# Patient Record
Sex: MALE | Race: WHITE | HISPANIC OR LATINO | Employment: OTHER | URBAN - NONMETROPOLITAN AREA
[De-identification: names, ages, dates, MRNs, and addresses within clinical notes are randomized per-mention and may not be internally consistent; named-entity substitution may affect disease eponyms.]

---

## 2021-03-01 ENCOUNTER — OFFICE VISIT (OUTPATIENT)
Dept: CARDIOLOGY | Facility: CLINIC | Age: 66
End: 2021-03-01

## 2021-03-01 VITALS
HEIGHT: 67 IN | SYSTOLIC BLOOD PRESSURE: 132 MMHG | OXYGEN SATURATION: 97 % | DIASTOLIC BLOOD PRESSURE: 66 MMHG | BODY MASS INDEX: 47.24 KG/M2 | WEIGHT: 301 LBS | TEMPERATURE: 98.7 F | RESPIRATION RATE: 17 BRPM | HEART RATE: 66 BPM

## 2021-03-01 DIAGNOSIS — E11.9 TYPE 2 DIABETES MELLITUS WITHOUT COMPLICATION, WITHOUT LONG-TERM CURRENT USE OF INSULIN (HCC): ICD-10-CM

## 2021-03-01 DIAGNOSIS — I25.5 ISCHEMIC CARDIOMYOPATHY: Primary | ICD-10-CM

## 2021-03-01 DIAGNOSIS — Z72.0 TOBACCO USE: ICD-10-CM

## 2021-03-01 DIAGNOSIS — I10 ESSENTIAL HYPERTENSION: ICD-10-CM

## 2021-03-01 DIAGNOSIS — E78.5 HYPERLIPIDEMIA LDL GOAL <70: ICD-10-CM

## 2021-03-01 PROCEDURE — 99214 OFFICE O/P EST MOD 30 MIN: CPT | Performed by: INTERNAL MEDICINE

## 2021-03-01 PROCEDURE — 93000 ELECTROCARDIOGRAM COMPLETE: CPT | Performed by: INTERNAL MEDICINE

## 2021-03-01 RX ORDER — METOPROLOL TARTRATE 50 MG/1
50 TABLET, FILM COATED ORAL 2 TIMES DAILY
Qty: 180 TABLET | Refills: 3 | Status: SHIPPED | OUTPATIENT
Start: 2021-03-01 | End: 2022-02-03 | Stop reason: SDUPTHER

## 2021-03-01 RX ORDER — SACUBITRIL AND VALSARTAN 97; 103 MG/1; MG/1
1 TABLET, FILM COATED ORAL 2 TIMES DAILY
Qty: 180 TABLET | Refills: 3 | Status: SHIPPED | OUTPATIENT
Start: 2021-03-01 | End: 2021-07-01 | Stop reason: SDUPTHER

## 2021-03-01 RX ORDER — DAPAGLIFLOZIN 10 MG/1
10 TABLET, FILM COATED ORAL DAILY
Qty: 90 TABLET | Refills: 3 | Status: SHIPPED | OUTPATIENT
Start: 2021-03-01 | End: 2021-07-01 | Stop reason: SDUPTHER

## 2021-03-01 RX ORDER — ROSUVASTATIN CALCIUM 40 MG/1
40 TABLET, COATED ORAL DAILY
Qty: 90 TABLET | Refills: 3 | Status: SHIPPED | OUTPATIENT
Start: 2021-03-01 | End: 2022-02-03 | Stop reason: SDUPTHER

## 2021-03-01 NOTE — PROGRESS NOTES
[facility] [place]  [facility] CARDIOLOGY  [address] [place] [address]  Dept: [phone]  Dept Fax: [phone]    Sacha Rojas  1955    Follow Up Office Visit Note    History of Present Illness:  Sacha Rojas is a 65 y.o. male who presents to the clinic for Follow-up.ischemic cardiomyopathy-He seems doing well, no edema, no major SOB, on Entresto 49.51 bid, Metoprolol 50 mg bid and farxiga 10 mg daily, will d.c Maxzide,     The following portions of the patient's history were reviewed and updated as appropriate: allergies, current medications, past family history, past medical history, past social history, past surgical history and problem list.    Medications:  ASPIRIN 81 PO  Entresto tablet  Farxiga tablet  metFORMIN  metoprolol tartrate  rosuvastatin  triamterene-hydrochlorothiazide  vitamin B-12  vitamin D3 tablet    Subjective  Allergies   Allergen Reactions   • Clopidogrel Bisulfate Hives   • Varenicline Tartrate Hives        Past Medical History:   Diagnosis Date   • Benign essential hypertension    • Body mass index (BMI)40.0-44.9, adult (CMS/[facility])    • CAD (coronary artery disease) 2001    100% RCA 95% prox LAD   • COPD (chronic obstructive pulmonary disease) (CMS/[facility])    • Coronary arteriosclerosis in native artery    • Hypertension    • Impaired fasting blood sugar    • Ischemic cardiomyopathy     He seems doing fine, no chest pain, no SOB, no edema, he underwent cardiac cath with Moderate disase 60 to 70 % CX, LAD 60 to 70 % and %; will keep medical treatment Entresto 49.51 bid, Metoprolol 50 mg bid will add Farixga 10 mg daily.   • Mixed hyperlipidemia    • Obesity    • Tobacco dependence syndrome    • Tonsillitis    • Type 2 diabetes mellitus without complication, without long-term current use of insulin (CMS/[facility])    • Vitamin D deficiency        Past Surgical History:   Procedure Laterality Date   • ADENOIDECTOMY     • OTHER SURGICAL HISTORY       "STENT PLACEMENT   • TONSILLECTOMY         Family History   Problem Relation Age of Onset   • Stomach cancer Mother    • Coronary artery disease Father    • Peripheral vascular disease Father    • Hypertension Father         Social History     Socioeconomic History   • Marital status:      Spouse name: Not on file   • Number of children: Not on file   • Years of education: Not on file   • Highest education level: Not on file   Tobacco Use   • Smoking status: Heavy Tobacco Smoker     Types: Cigarettes   • Smokeless tobacco: Never Used   Substance and Sexual Activity   • Alcohol use: Not Currently   • Drug use: Never   • Sexual activity: Defer       Review of Systems   Constitutional: Negative.    HENT: Negative.    Respiratory: Negative.    Cardiovascular: Negative.    Endocrine: Negative.    Genitourinary: Negative.    Musculoskeletal: Negative.    Skin: Negative.    Allergic/Immunologic: Negative.    Neurological: Negative.    Hematological: Negative.    Psychiatric/Behavioral: Negative.        Cardiovascular Procedures    ECHO/MUGA:   STRESS TESTS:   CARDIAC CATH:   DEVICES:   HOLTER:   CT/MRI:   VASCULAR:   CARDIOTHORACIC:     Objective  Vitals:    03/01/21 1143   BP: 132/66   BP Location: Left arm   Patient Position: Sitting   Cuff Size: Large Adult   Pulse: 66   Resp: 17   Temp: 98.7 °F (37.1 °C)   TempSrc: Infrared   SpO2: 97%   Weight: (!) 137 kg (301 lb)   Height: 170.2 cm (67\")   PainSc: 0-No pain     Body mass index is 47.14 kg/m².     Physical Exam  Constitutional:       Appearance: Healthy appearance. Not in distress.   Neck:      Vascular: No JVR. JVD normal.   Pulmonary:      Effort: Pulmonary effort is normal.      Breath sounds: Normal breath sounds. No wheezing. No rhonchi. No rales.   Chest:      Chest wall: Not tender to palpatation.   Cardiovascular:      PMI at left midclavicular line. Normal rate. Regular rhythm. Normal S1. Normal S2.      Murmurs: There is no murmur.      No gallop. No " click. No rub.   Pulses:     Intact distal pulses.   Edema:     Peripheral edema absent.   Abdominal:      General: Bowel sounds are normal.      Palpations: Abdomen is soft.      Tenderness: There is no abdominal tenderness.   Musculoskeletal: Normal range of motion.         General: No tenderness.   Skin:     General: Skin is warm and dry.   Neurological:      General: No focal deficit present.      Mental Status: Alert and oriented to person, place and time.          Diagnostic Data    ECG 12 Lead    Date/Time: 3/1/2021 12:30 PM  Performed by: Darrion Lam MD  Authorized by: Darrion Lam MD   Comparison: compared with previous ECG   Similar to previous ECG  Rhythm: sinus rhythm  Rate: normal  BPM: 63  Conduction: non-specific intraventricular conduction delay  QRS axis: left    Clinical impression: normal ECG            Assessment and Plan  Diagnoses and all orders for this visit:    Ischemic cardiomyopathy- Seems doing well, will increase Entresto to 97.,103 bid, will keep Metoprolol and farxiga, will d.c Maxzide, we might add Aldactone next visit    Essential hypertension-BP is 140.80, on Entresto, Metoprolol,    Type 2 diabetes mellitus without complication, without long-term current use of insulin (CMS/[facility])    Tobacco use- Still smoking  Hyperlipidemia -- On Crestor lab from Dr Solano was elevated 180 LDL ? Claims to be taking his meds, will get a new lipid next visit         No follow-ups on file.    Darrion Lam MD  03/01/2021

## 2021-03-02 ENCOUNTER — TELEPHONE (OUTPATIENT)
Dept: CARDIOLOGY | Facility: CLINIC | Age: 66
End: 2021-03-02

## 2021-03-02 NOTE — TELEPHONE ENCOUNTER
PT'S wife, Marline called on behalf of PT.  He was in yesterday and was prescribed numerous medications but one specifically was taken off & one has doubled in dosage.  Marline is currently not on PT's  verbal release.  Please call PT's daughter Marcelal and clarify what medications need to be taken and the correct dosage. PT is currently working and can't  the phone.  PT's wife is concerned he may be taking wrong dosage.     PT's daughter Marcella can be reached at [phone]

## 2021-04-26 ENCOUNTER — TELEPHONE (OUTPATIENT)
Dept: CARDIOLOGY | Facility: CLINIC | Age: 66
End: 2021-04-26

## 2021-04-27 ENCOUNTER — TELEPHONE (OUTPATIENT)
Dept: CARDIOLOGY | Facility: CLINIC | Age: 66
End: 2021-04-27

## 2021-04-27 NOTE — TELEPHONE ENCOUNTER
PTS WIFE CALLED REGARDING MEDICATIONS. PT IS GETTING READY TO SIGN UP FOR MEDICARE, HE HAS 2 PRESCRIPTIONS HIS INSURANCE WILL NOT COVER. MEDICATIONS ARE ENTRESTO AND FARXIGA. PT CAN NOT AFFORD  WITHOUT INSURANCE COVERING THEM. WANTS ANOTHER MEDICATION THEY COULD SUBSTITUTE IN PLACE OF THOSE MEDICATIONS THAT INSURANCE WILL COVER. OR PLEASE ADVISE     BEST CALL BACK NUMBER: [phone]

## 2021-04-28 NOTE — TELEPHONE ENCOUNTER
Wife called back and gave his plan id# [id number] for [employer] eff 6/1/21. I told her a PA can't be started before he's effective, but she's insistent.

## 2021-06-15 ENCOUNTER — TELEPHONE (OUTPATIENT)
Dept: CARDIOLOGY | Facility: CLINIC | Age: 66
End: 2021-06-15

## 2021-06-16 ENCOUNTER — TELEPHONE (OUTPATIENT)
Dept: CARDIOLOGY | Facility: CLINIC | Age: 66
End: 2021-06-16

## 2021-06-16 DIAGNOSIS — E11.9 TYPE 2 DIABETES MELLITUS WITHOUT COMPLICATION, WITHOUT LONG-TERM CURRENT USE OF INSULIN (HCC): Primary | ICD-10-CM

## 2021-06-16 DIAGNOSIS — I25.5 ISCHEMIC CARDIOMYOPATHY: ICD-10-CM

## 2021-06-16 RX ORDER — DAPAGLIFLOZIN 10 MG/1
10 TABLET, FILM COATED ORAL DAILY
Qty: 90 TABLET | Refills: 3 | Status: CANCELLED | OUTPATIENT
Start: 2021-06-16

## 2021-06-16 NOTE — TELEPHONE ENCOUNTER
Pt needs to take insurance card to pharm so that he can see what price is and if it realy needs pa

## 2021-06-16 NOTE — TELEPHONE ENCOUNTER
PATIENT SAID HE IS RETIRED NOW, AND WAS TOLD HE WAS TO WAIT UNTIL NOW SO THE [facility] CAN GET A PA

## 2021-06-17 NOTE — TELEPHONE ENCOUNTER
entresto is 573 dollars for 90 day supply they are working on Dapagliflozin Propanediol (Farxiga) 10 MG tablet      Please advise about PA    Ted afford $573 dollars

## 2021-06-21 ENCOUNTER — TELEPHONE (OUTPATIENT)
Dept: CARDIOLOGY | Facility: CLINIC | Age: 66
End: 2021-06-21

## 2021-06-21 NOTE — TELEPHONE ENCOUNTER
PATIENT WOULD LIKE SAMPLES OF Dapagliflozin Propanediol (Farxiga) 10 MG tablet    UNTIL THE PA GOES THROUGH      PLEASE ADVISE

## 2021-07-01 DIAGNOSIS — I25.5 ISCHEMIC CARDIOMYOPATHY: ICD-10-CM

## 2021-07-01 DIAGNOSIS — E11.9 TYPE 2 DIABETES MELLITUS WITHOUT COMPLICATION, WITHOUT LONG-TERM CURRENT USE OF INSULIN (HCC): Primary | ICD-10-CM

## 2021-07-01 RX ORDER — SACUBITRIL AND VALSARTAN 97; 103 MG/1; MG/1
1 TABLET, FILM COATED ORAL 2 TIMES DAILY
Qty: 180 TABLET | Refills: 3 | Status: SHIPPED | OUTPATIENT
Start: 2021-07-01 | End: 2022-01-27

## 2021-07-01 RX ORDER — DAPAGLIFLOZIN 10 MG/1
10 TABLET, FILM COATED ORAL DAILY
Qty: 90 TABLET | Refills: 3 | Status: SHIPPED | OUTPATIENT
Start: 2021-07-01 | End: 2022-02-03 | Stop reason: SDUPTHER

## 2021-07-06 ENCOUNTER — TELEPHONE (OUTPATIENT)
Dept: CARDIOLOGY | Facility: CLINIC | Age: 66
End: 2021-07-06

## 2021-08-30 ENCOUNTER — LAB (OUTPATIENT)
Dept: CARDIOLOGY | Facility: CLINIC | Age: 66
End: 2021-08-30

## 2021-08-30 DIAGNOSIS — E78.5 HYPERLIPIDEMIA LDL GOAL <70: ICD-10-CM

## 2021-08-30 DIAGNOSIS — I25.5 ISCHEMIC CARDIOMYOPATHY: Primary | ICD-10-CM

## 2021-08-30 DIAGNOSIS — Z72.0 TOBACCO USE: ICD-10-CM

## 2021-08-30 DIAGNOSIS — I10 ESSENTIAL HYPERTENSION: ICD-10-CM

## 2021-08-30 DIAGNOSIS — E11.9 TYPE 2 DIABETES MELLITUS WITHOUT COMPLICATION, WITHOUT LONG-TERM CURRENT USE OF INSULIN (HCC): ICD-10-CM

## 2021-08-31 ENCOUNTER — TELEPHONE (OUTPATIENT)
Dept: CARDIOLOGY | Facility: CLINIC | Age: 66
End: 2021-08-31

## 2021-08-31 LAB
ALBUMIN SERPL-MCNC: 4.4 G/DL (ref 3.8–4.8)
ALBUMIN/GLOB SERPL: 2 {RATIO} (ref 1.2–2.2)
ALP SERPL-CCNC: 116 IU/L (ref 48–121)
ALT SERPL-CCNC: 20 IU/L (ref 0–44)
AST SERPL-CCNC: 19 IU/L (ref 0–40)
BASOPHILS # BLD AUTO: 0 X10E3/UL (ref 0–0.2)
BASOPHILS NFR BLD AUTO: 1 %
BILIRUB SERPL-MCNC: 0.4 MG/DL (ref 0–1.2)
BUN SERPL-MCNC: 12 MG/DL (ref 8–27)
BUN/CREAT SERPL: 14 (ref 10–24)
CALCIUM SERPL-MCNC: 9.2 MG/DL (ref 8.6–10.2)
CHLORIDE SERPL-SCNC: 105 MMOL/L (ref 96–106)
CHOLEST SERPL-MCNC: 127 MG/DL (ref 100–199)
CK SERPL-CCNC: 53 U/L (ref 41–331)
CO2 SERPL-SCNC: 20 MMOL/L (ref 20–29)
CREAT SERPL-MCNC: 0.85 MG/DL (ref 0.76–1.27)
EOSINOPHIL # BLD AUTO: 0.2 X10E3/UL (ref 0–0.4)
EOSINOPHIL NFR BLD AUTO: 3 %
ERYTHROCYTE [DISTWIDTH] IN BLOOD BY AUTOMATED COUNT: 16.6 % (ref 11.6–15.4)
GLOBULIN SER CALC-MCNC: 2.2 G/DL (ref 1.5–4.5)
GLUCOSE SERPL-MCNC: 100 MG/DL (ref 65–99)
HCT VFR BLD AUTO: 50.1 % (ref 37.5–51)
HDLC SERPL-MCNC: 34 MG/DL
HGB BLD-MCNC: 16.2 G/DL (ref 13–17.7)
IMM GRANULOCYTES # BLD AUTO: 0 X10E3/UL (ref 0–0.1)
IMM GRANULOCYTES NFR BLD AUTO: 0 %
LDLC SERPL CALC-MCNC: 72 MG/DL (ref 0–99)
LYMPHOCYTES # BLD AUTO: 2.2 X10E3/UL (ref 0.7–3.1)
LYMPHOCYTES NFR BLD AUTO: 26 %
MCH RBC QN AUTO: 28.7 PG (ref 26.6–33)
MCHC RBC AUTO-ENTMCNC: 32.3 G/DL (ref 31.5–35.7)
MCV RBC AUTO: 89 FL (ref 79–97)
MONOCYTES # BLD AUTO: 0.6 X10E3/UL (ref 0.1–0.9)
MONOCYTES NFR BLD AUTO: 7 %
NEUTROPHILS # BLD AUTO: 5.5 X10E3/UL (ref 1.4–7)
NEUTROPHILS NFR BLD AUTO: 63 %
PLATELET # BLD AUTO: 181 X10E3/UL (ref 150–450)
POTASSIUM SERPL-SCNC: 4.1 MMOL/L (ref 3.5–5.2)
PROT SERPL-MCNC: 6.6 G/DL (ref 6–8.5)
RBC # BLD AUTO: 5.65 X10E6/UL (ref 4.14–5.8)
SODIUM SERPL-SCNC: 141 MMOL/L (ref 134–144)
TRIGL SERPL-MCNC: 112 MG/DL (ref 0–149)
VLDLC SERPL CALC-MCNC: 21 MG/DL (ref 5–40)
WBC # BLD AUTO: 8.5 X10E3/UL (ref 3.4–10.8)

## 2021-09-10 ENCOUNTER — OFFICE VISIT (OUTPATIENT)
Dept: CARDIOLOGY | Facility: CLINIC | Age: 66
End: 2021-09-10

## 2021-09-10 VITALS
WEIGHT: 306 LBS | HEART RATE: 82 BPM | BODY MASS INDEX: 48.03 KG/M2 | DIASTOLIC BLOOD PRESSURE: 88 MMHG | TEMPERATURE: 97 F | RESPIRATION RATE: 12 BRPM | HEIGHT: 67 IN | SYSTOLIC BLOOD PRESSURE: 162 MMHG | OXYGEN SATURATION: 99 %

## 2021-09-10 DIAGNOSIS — E11.9 TYPE 2 DIABETES MELLITUS WITHOUT COMPLICATION, WITHOUT LONG-TERM CURRENT USE OF INSULIN (HCC): ICD-10-CM

## 2021-09-10 DIAGNOSIS — I25.5 ISCHEMIC CARDIOMYOPATHY: Primary | ICD-10-CM

## 2021-09-10 DIAGNOSIS — E78.5 HYPERLIPIDEMIA LDL GOAL <70: ICD-10-CM

## 2021-09-10 DIAGNOSIS — Z72.0 TOBACCO USE: ICD-10-CM

## 2021-09-10 DIAGNOSIS — I10 ESSENTIAL HYPERTENSION: ICD-10-CM

## 2021-09-10 PROCEDURE — 99214 OFFICE O/P EST MOD 30 MIN: CPT | Performed by: INTERNAL MEDICINE

## 2021-09-10 RX ORDER — SPIRONOLACTONE 25 MG/1
25 TABLET ORAL DAILY
Qty: 90 TABLET | Refills: 3 | Status: SHIPPED | OUTPATIENT
Start: 2021-09-10 | End: 2022-02-03 | Stop reason: SDUPTHER

## 2021-09-10 NOTE — PROGRESS NOTES
[facility] [place]  [facility] CARDIOLOGY  [address] [place] [address]  Dept: [phone]  Dept Fax: [phone]    Sacha Rojas  1955    Follow Up Office Visit Note    History of Present Illness:  Sacha Rojas is a 66 y.o. male who presents to the clinic for Follow-up. CAD- He denies any chest pain, or SOB, - He seems doing well, his BP is 145.80, will add Aldactone 25 mg, he is on Entresto 97.,103 bid, Metoprolol 50 mg bid and Farxiga 10 mg daily  With ASA, will recheck the Ef next visit with echo last year was 35 to 40%    The following portions of the patient's history were reviewed and updated as appropriate: allergies, current medications, past family history, past medical history, past social history, past surgical history and problem list.    Medications:  ASPIRIN 81 PO  Entresto tablet  Farxiga tablet  metFORMIN  metoprolol tartrate  rosuvastatin  vitamin B-12  vitamin D3 tablet    Subjective  Allergies   Allergen Reactions   • Clopidogrel Bisulfate Hives   • Varenicline Tartrate Hives        Past Medical History:   Diagnosis Date   • Benign essential hypertension    • Body mass index (BMI)40.0-44.9, adult (CMS/[facility])    • CAD (coronary artery disease) 2001    100% RCA 95% prox LAD   • COPD (chronic obstructive pulmonary disease) (CMS/[facility])    • Coronary arteriosclerosis in native artery    • Hypertension    • Impaired fasting blood sugar    • Ischemic cardiomyopathy     He seems doing fine, no chest pain, no SOB, no edema, he underwent cardiac cath with Moderate disase 60 to 70 % CX, LAD 60 to 70 % and %; will keep medical treatment Entresto 49.51 bid, Metoprolol 50 mg bid will add Farixga 10 mg daily.   • Mixed hyperlipidemia    • Obesity    • Tobacco dependence syndrome    • Tonsillitis    • Type 2 diabetes mellitus without complication, without long-term current use of insulin (CMS/[facility])    • Vitamin D deficiency        Past Surgical History:  "  Procedure Laterality Date   • ADENOIDECTOMY     • OTHER SURGICAL HISTORY      STENT PLACEMENT   • TONSILLECTOMY         Family History   Problem Relation Age of Onset   • Stomach cancer Mother    • Coronary artery disease Father    • Peripheral vascular disease Father    • Hypertension Father         Social History     Socioeconomic History   • Marital status:      Spouse name: Not on file   • Number of children: Not on file   • Years of education: Not on file   • Highest education level: Not on file   Tobacco Use   • Smoking status: Heavy Tobacco Smoker     Types: Cigarettes   • Smokeless tobacco: Never Used   Vaping Use   • Vaping Use: Never used   Substance and Sexual Activity   • Alcohol use: Not Currently   • Drug use: Never   • Sexual activity: Defer       Review of Systems   Constitutional: Negative.    HENT: Negative.    Respiratory: Negative.    Cardiovascular: Negative.    Endocrine: Negative.    Genitourinary: Negative.    Musculoskeletal: Negative.    Skin: Negative.    Allergic/Immunologic: Negative.    Neurological: Negative.    Hematological: Negative.    Psychiatric/Behavioral: Negative.    All other systems reviewed and are negative.      Cardiovascular Procedures    ECHO/MUGA:   STRESS TESTS:   CARDIAC CATH:   DEVICES:   HOLTER:   CT/MRI:   VASCULAR:   CARDIOTHORACIC:     Objective  Vitals:    09/10/21 1437   BP: 162/88   BP Location: Left arm   Patient Position: Sitting   Cuff Size: Adult   Pulse: 82   Resp: 12   Temp: 97 °F (36.1 °C)   TempSrc: Infrared   SpO2: 99%   Weight: (!) 139 kg (306 lb)   Height: 170.2 cm (67\")   PainSc: 0-No pain     Body mass index is 47.93 kg/m².     Physical Exam  Vitals reviewed.   Constitutional:       Appearance: Healthy appearance. Not in distress.   Eyes:      Pupils: Pupils are equal, round, and reactive to light.   HENT:    Mouth/Throat:      Pharynx: Oropharynx is clear.   Neck:      Thyroid: Thyroid normal.      Vascular: No JVR. JVD normal. "   Pulmonary:      Effort: Pulmonary effort is normal.      Breath sounds: Normal breath sounds. No wheezing. No rhonchi. No rales.   Chest:      Chest wall: Not tender to palpatation.   Cardiovascular:      PMI at left midclavicular line. Normal rate. Regular rhythm. Normal S1. Normal S2.      Murmurs: There is no murmur.      No gallop. No click. No rub.   Pulses:     Carotid: 3+ bilaterally.     Radial: 3+ bilaterally.     Femoral: 3+ bilaterally.     Dorsalis pedis: 3+ bilaterally.     Posterior tibial: 3+ bilaterally.  Edema:     Peripheral edema absent.   Abdominal:      General: Bowel sounds are normal.      Palpations: Abdomen is soft.      Tenderness: There is no abdominal tenderness.   Musculoskeletal: Normal range of motion.         General: No tenderness.      Cervical back: Normal range of motion and neck supple. Skin:     General: Skin is warm and dry.   Neurological:      General: No focal deficit present.      Mental Status: Alert and oriented to person, place and time.          Diagnostic Data  Procedures    Assessment and Plan  Diagnoses and all orders for this visit:    Ischemic cardiomyopathy- He seems doing well, mild edema, will add Aldactone 25 mg, keep Entresto, Metoprolol and Farxiga, will get an echo in 6 months    Essential hypertension- The BP is 145.80. will add Aldactone     Type 2 diabetes mellitus without complication, without long-term current use of insulin (CMS/[facility])    Tobacco use- Still smoking     Hyperlipidemia LDL goal <70- Crestor 40 mg         No follow-ups on file.    Darrion Lam MD  09/10/2021

## 2022-01-26 ENCOUNTER — TELEPHONE (OUTPATIENT)
Dept: CARDIOLOGY | Facility: CLINIC | Age: 67
End: 2022-01-26

## 2022-01-26 ENCOUNTER — OFFICE VISIT (OUTPATIENT)
Dept: CARDIOLOGY | Facility: CLINIC | Age: 67
End: 2022-01-26

## 2022-01-26 VITALS
WEIGHT: 314 LBS | HEART RATE: 72 BPM | BODY MASS INDEX: 49.28 KG/M2 | DIASTOLIC BLOOD PRESSURE: 78 MMHG | RESPIRATION RATE: 18 BRPM | SYSTOLIC BLOOD PRESSURE: 126 MMHG | HEIGHT: 67 IN | TEMPERATURE: 97.5 F | OXYGEN SATURATION: 97 %

## 2022-01-26 DIAGNOSIS — E11.9 TYPE 2 DIABETES MELLITUS WITHOUT COMPLICATION, WITHOUT LONG-TERM CURRENT USE OF INSULIN: ICD-10-CM

## 2022-01-26 DIAGNOSIS — Z72.0 TOBACCO USE: ICD-10-CM

## 2022-01-26 DIAGNOSIS — E78.5 HYPERLIPIDEMIA LDL GOAL <70: ICD-10-CM

## 2022-01-26 DIAGNOSIS — I25.5 ISCHEMIC CARDIOMYOPATHY: ICD-10-CM

## 2022-01-26 DIAGNOSIS — I10 ESSENTIAL HYPERTENSION: Primary | ICD-10-CM

## 2022-01-26 PROCEDURE — 99214 OFFICE O/P EST MOD 30 MIN: CPT | Performed by: INTERNAL MEDICINE

## 2022-01-26 PROCEDURE — 93000 ELECTROCARDIOGRAM COMPLETE: CPT | Performed by: INTERNAL MEDICINE

## 2022-01-26 NOTE — TELEPHONE ENCOUNTER
sacubitril-valsartan (Entresto)  MG tablet     form was never filled please fill it out      And send it

## 2022-01-26 NOTE — PROGRESS NOTES
[facility] [place]  [facility] CARDIOLOGY  [address] [place] [address]  Dept: [phone]  Dept Fax: [phone]    Sacha Rojas  1955     Follow Up Office Visit Note    History of Present Illness:  Sacha Rojas is a 66 y.o. male who presents to the clinic for  Ischemic cardiomyopathy- He denies any complaints SOB, chest pain, edema, he is on Entresto 97,.103 bid,Metoprolol 50 mg bid,Aldactone 25 mg and  Farxiga 10 mg, EKG no changes old sheryl septal MI, HR 62, he has prior stents to LAD and OM  2004, last cardiac cath  10.2020 moderate disease Cx 70%. LAD 40% proximal and 60% distal RCA is chonically occluded, , on Asa, will keep same meds , will get an echo to reevaluate EF,. Last one was 2020 32 by nuclear and 35 to 405 by echo,     The following portions of the patient's history were reviewed and updated as appropriate: allergies, current medications, past family history, past medical history, past social history, past surgical history and problem list.    Medications:  ASPIRIN 81 PO  Entresto tablet  Farxiga tablet  metFORMIN  metoprolol tartrate  rosuvastatin  spironolactone  vitamin B-12  vitamin D3 tablet    Subjective  Allergies   Allergen Reactions   • Clopidogrel Bisulfate Hives   • Varenicline Tartrate Hives        Past Medical History:   Diagnosis Date   • Benign essential hypertension    • Body mass index (BMI)40.0-44.9, adult    • CAD (coronary artery disease) 2001    100% RCA 95% prox LAD   • COPD (chronic obstructive pulmonary disease) ([facility])    • Coronary arteriosclerosis in native artery    • Hypertension    • Impaired fasting blood sugar    • Ischemic cardiomyopathy     He seems doing fine, no chest pain, no SOB, no edema, he underwent cardiac cath with Moderate disase 60 to 70 % CX, LAD 60 to 70 % and %; will keep medical treatment Entresto 49.51 bid, Metoprolol 50 mg bid will add Farixga 10 mg daily.   • Mixed hyperlipidemia    •  "Obesity    • Tobacco dependence syndrome    • Tonsillitis    • Type 2 diabetes mellitus without complication, without long-term current use of insulin (HCC)    • Vitamin D deficiency        Past Surgical History:   Procedure Laterality Date   • ADENOIDECTOMY     • OTHER SURGICAL HISTORY      STENT PLACEMENT   • TONSILLECTOMY         Family History   Problem Relation Age of Onset   • Stomach cancer Mother    • Coronary artery disease Father    • Peripheral vascular disease Father    • Hypertension Father         Social History     Socioeconomic History   • Marital status:    Tobacco Use   • Smoking status: Heavy Tobacco Smoker     Types: Cigarettes   • Smokeless tobacco: Never Used   Vaping Use   • Vaping Use: Never used   Substance and Sexual Activity   • Alcohol use: Not Currently   • Drug use: Never   • Sexual activity: Defer       Review of Systems   Constitutional: Negative.    HENT: Negative.    Respiratory: Negative.    Cardiovascular: Negative.    Endocrine: Negative.    Genitourinary: Negative.    Musculoskeletal: Negative.    Skin: Negative.    Allergic/Immunologic: Negative.    Neurological: Negative.    Hematological: Negative.    Psychiatric/Behavioral: Negative.    All other systems reviewed and are negative.      Cardiovascular Procedures    ECHO/MUGA:   STRESS TESTS:   CARDIAC CATH:   DEVICES:   HOLTER:   CT/MRI:   VASCULAR:   CARDIOTHORACIC:     Objective  Vitals:    01/26/22 0923   BP: 126/78   BP Location: Right arm   Patient Position: Lying   Cuff Size: Large Adult   Pulse: 72   Resp: 18   Temp: 97.5 °F (36.4 °C)   TempSrc: Infrared   SpO2: 97%   Weight: (!) 142 kg (314 lb)   Height: 170.2 cm (67.01\")   PainSc: 0-No pain     Body mass index is 49.17 kg/m².     Physical Exam  Constitutional:       Appearance: Healthy appearance. Not in distress.   Neck:      Vascular: No JVR. JVD normal.   Pulmonary:      Effort: Pulmonary effort is normal.      Breath sounds: Normal breath sounds. No " wheezing. No rhonchi. No rales.   Chest:      Chest wall: Not tender to palpatation.   Cardiovascular:      PMI at left midclavicular line. Normal rate. Regular rhythm. Normal S1. Normal S2.      Murmurs: There is no murmur.      No gallop. No click. No rub.   Pulses:     Intact distal pulses.   Edema:     Peripheral edema absent.   Abdominal:      General: Bowel sounds are normal.      Palpations: Abdomen is soft.      Tenderness: There is no abdominal tenderness.   Musculoskeletal: Normal range of motion.         General: No tenderness. Skin:     General: Skin is warm and dry.   Neurological:      General: No focal deficit present.      Mental Status: Alert and oriented to person, place and time.          Diagnostic Data    ECG 12 Lead    Date/Time: 1/26/2022 10:02 AM  Performed by: Darrion Lam MD  Authorized by: Darrion Lam MD   Comparison: compared with previous ECG from 1/3/2021  Rhythm: sinus rhythm  Rate: normal  BPM: 62  Q waves: V1, V2, V3 and V4    QRS axis: left  Other findings: non-specific ST-T wave changes and poor R wave progression    Clinical impression: abnormal EKG and myocardial infarction            Assessment and Plan  Diagnoses and all orders for this visit:    Essential hypertension- The BP is fine 125.80. on Aldactone, Entresto, Metoprolol   -     CBC & Differential  -     Lipid Panel    Hyperlipidemia LDL goal <70- On Crestor 40 mg, we need lipid today   -     Lipid Panel    Ischemic cardiomyopathy- Denies any major complaints, prior stent to LAD and PL Om, in 2004,. , last cath  10.2020 moderate disease on Medical treatment   -     Adult Transthoracic Echo Complete W/ Cont if Necessary Per Protocol; Future  -     CBC & Differential  -     Comprehensive Metabolic Panel  -     Lipid Panel    Type 2 diabetes mellitus without complication, without long-term current use of insulin (HCC)    Tobacco use- Still smoking, high risk recurrent events          No follow-ups on  file.    Darrion Lam MD  01/26/2022

## 2022-01-26 NOTE — TELEPHONE ENCOUNTER
Patient has been scheduled for Echo to be done at [facility], due to weight limit, Wed 2/2/22 12:45 arrival. Patient was given appointment information at checkout.

## 2022-01-27 DIAGNOSIS — I25.5 ISCHEMIC CARDIOMYOPATHY: ICD-10-CM

## 2022-01-27 LAB
BASOPHILS # BLD AUTO: 0 X10E3/UL (ref 0–0.2)
BASOPHILS NFR BLD AUTO: 0 %
EOSINOPHIL # BLD AUTO: 0.3 X10E3/UL (ref 0–0.4)
EOSINOPHIL NFR BLD AUTO: 3 %
ERYTHROCYTE [DISTWIDTH] IN BLOOD BY AUTOMATED COUNT: 15.8 % (ref 11.6–15.4)
HCT VFR BLD AUTO: 50.2 % (ref 37.5–51)
HGB BLD-MCNC: 17 G/DL (ref 13–17.7)
IMM GRANULOCYTES # BLD AUTO: 0.1 X10E3/UL (ref 0–0.1)
IMM GRANULOCYTES NFR BLD AUTO: 1 %
LYMPHOCYTES # BLD AUTO: 2.1 X10E3/UL (ref 0.7–3.1)
LYMPHOCYTES NFR BLD AUTO: 22 %
MCH RBC QN AUTO: 29 PG (ref 26.6–33)
MCHC RBC AUTO-ENTMCNC: 33.9 G/DL (ref 31.5–35.7)
MCV RBC AUTO: 86 FL (ref 79–97)
MONOCYTES # BLD AUTO: 0.7 X10E3/UL (ref 0.1–0.9)
MONOCYTES NFR BLD AUTO: 7 %
NEUTROPHILS # BLD AUTO: 6.5 X10E3/UL (ref 1.4–7)
NEUTROPHILS NFR BLD AUTO: 67 %
PLATELET # BLD AUTO: 198 X10E3/UL (ref 150–450)
RBC # BLD AUTO: 5.86 X10E6/UL (ref 4.14–5.8)
SPECIMEN STATUS: NORMAL
WBC # BLD AUTO: 9.7 X10E3/UL (ref 3.4–10.8)

## 2022-01-27 RX ORDER — SACUBITRIL AND VALSARTAN 97; 103 MG/1; MG/1
1 TABLET, FILM COATED ORAL 2 TIMES DAILY
Qty: 180 TABLET | Refills: 3 | Status: SHIPPED | OUTPATIENT
Start: 2022-01-27 | End: 2022-01-28

## 2022-01-28 ENCOUNTER — LAB (OUTPATIENT)
Dept: CARDIOLOGY | Facility: CLINIC | Age: 67
End: 2022-01-28

## 2022-01-28 ENCOUNTER — TELEPHONE (OUTPATIENT)
Dept: CARDIOLOGY | Facility: CLINIC | Age: 67
End: 2022-01-28

## 2022-01-28 DIAGNOSIS — E78.5 HYPERLIPIDEMIA LDL GOAL <70: Primary | ICD-10-CM

## 2022-01-28 DIAGNOSIS — I25.5 ISCHEMIC CARDIOMYOPATHY: ICD-10-CM

## 2022-01-28 RX ORDER — SACUBITRIL AND VALSARTAN 97; 103 MG/1; MG/1
1 TABLET, FILM COATED ORAL 2 TIMES DAILY
Qty: 180 TABLET | Refills: 3 | Status: SHIPPED | OUTPATIENT
Start: 2022-01-28 | End: 2022-02-03 | Stop reason: SDUPTHER

## 2022-01-29 LAB
ALBUMIN SERPL-MCNC: 4.5 G/DL (ref 3.8–4.8)
ALBUMIN/GLOB SERPL: 1.8 {RATIO} (ref 1.2–2.2)
ALP SERPL-CCNC: 116 IU/L (ref 44–121)
ALT SERPL-CCNC: 30 IU/L (ref 0–44)
AST SERPL-CCNC: 23 IU/L (ref 0–40)
BILIRUB SERPL-MCNC: 0.4 MG/DL (ref 0–1.2)
BUN SERPL-MCNC: 16 MG/DL (ref 8–27)
BUN/CREAT SERPL: 15 (ref 10–24)
CALCIUM SERPL-MCNC: 9.7 MG/DL (ref 8.6–10.2)
CHLORIDE SERPL-SCNC: 104 MMOL/L (ref 96–106)
CHOLEST SERPL-MCNC: 144 MG/DL (ref 100–199)
CO2 SERPL-SCNC: 22 MMOL/L (ref 20–29)
CREAT SERPL-MCNC: 1.08 MG/DL (ref 0.76–1.27)
GLOBULIN SER CALC-MCNC: 2.5 G/DL (ref 1.5–4.5)
GLUCOSE SERPL-MCNC: 124 MG/DL (ref 65–99)
HDLC SERPL-MCNC: 34 MG/DL
LDLC SERPL CALC-MCNC: 86 MG/DL (ref 0–99)
POTASSIUM SERPL-SCNC: 4.6 MMOL/L (ref 3.5–5.2)
PROT SERPL-MCNC: 7 G/DL (ref 6–8.5)
SODIUM SERPL-SCNC: 142 MMOL/L (ref 134–144)
TRIGL SERPL-MCNC: 137 MG/DL (ref 0–149)
VLDLC SERPL CALC-MCNC: 24 MG/DL (ref 5–40)

## 2022-02-02 ENCOUNTER — OUTSIDE FACILITY SERVICE (OUTPATIENT)
Dept: CARDIOLOGY | Facility: CLINIC | Age: 67
End: 2022-02-02

## 2022-02-02 PROCEDURE — 93306 TTE W/DOPPLER COMPLETE: CPT | Performed by: INTERNAL MEDICINE

## 2022-02-03 ENCOUNTER — OFFICE VISIT (OUTPATIENT)
Dept: CARDIOLOGY | Facility: CLINIC | Age: 67
End: 2022-02-03

## 2022-02-03 VITALS
HEART RATE: 69 BPM | WEIGHT: 306 LBS | HEIGHT: 67 IN | TEMPERATURE: 97.1 F | DIASTOLIC BLOOD PRESSURE: 76 MMHG | BODY MASS INDEX: 48.03 KG/M2 | SYSTOLIC BLOOD PRESSURE: 144 MMHG | OXYGEN SATURATION: 96 % | RESPIRATION RATE: 15 BRPM

## 2022-02-03 DIAGNOSIS — E11.9 TYPE 2 DIABETES MELLITUS WITHOUT COMPLICATION, WITHOUT LONG-TERM CURRENT USE OF INSULIN: ICD-10-CM

## 2022-02-03 DIAGNOSIS — I25.5 ISCHEMIC CARDIOMYOPATHY: Primary | ICD-10-CM

## 2022-02-03 DIAGNOSIS — I10 ESSENTIAL HYPERTENSION: ICD-10-CM

## 2022-02-03 DIAGNOSIS — Z72.0 TOBACCO USE: ICD-10-CM

## 2022-02-03 DIAGNOSIS — E78.5 HYPERLIPIDEMIA LDL GOAL <70: ICD-10-CM

## 2022-02-03 PROCEDURE — 99214 OFFICE O/P EST MOD 30 MIN: CPT | Performed by: INTERNAL MEDICINE

## 2022-02-03 RX ORDER — ROSUVASTATIN CALCIUM 40 MG/1
40 TABLET, COATED ORAL DAILY
Qty: 90 TABLET | Refills: 3 | Status: SHIPPED | OUTPATIENT
Start: 2022-02-03 | End: 2022-05-09 | Stop reason: SDUPTHER

## 2022-02-03 RX ORDER — METOPROLOL TARTRATE 50 MG/1
50 TABLET, FILM COATED ORAL 2 TIMES DAILY
Qty: 180 TABLET | Refills: 3 | Status: SHIPPED | OUTPATIENT
Start: 2022-02-03 | End: 2022-05-09 | Stop reason: SDUPTHER

## 2022-02-03 RX ORDER — SPIRONOLACTONE 25 MG/1
25 TABLET ORAL DAILY
Qty: 90 TABLET | Refills: 3 | Status: SHIPPED | OUTPATIENT
Start: 2022-02-03 | End: 2022-10-03 | Stop reason: SDUPTHER

## 2022-02-03 RX ORDER — EZETIMIBE 10 MG/1
10 TABLET ORAL DAILY
Qty: 90 TABLET | Refills: 3 | Status: SHIPPED | OUTPATIENT
Start: 2022-02-03 | End: 2022-06-28 | Stop reason: SDUPTHER

## 2022-02-03 RX ORDER — DAPAGLIFLOZIN 10 MG/1
10 TABLET, FILM COATED ORAL DAILY
Qty: 90 TABLET | Refills: 3 | Status: SHIPPED | OUTPATIENT
Start: 2022-02-03 | End: 2022-03-31

## 2022-02-03 RX ORDER — SACUBITRIL AND VALSARTAN 97; 103 MG/1; MG/1
1 TABLET, FILM COATED ORAL 2 TIMES DAILY
Qty: 180 TABLET | Refills: 3 | Status: SHIPPED | OUTPATIENT
Start: 2022-02-03 | End: 2022-10-03 | Stop reason: SDUPTHER

## 2022-02-03 NOTE — PROGRESS NOTES
[facility] [place]  [facility] CARDIOLOGY  [address] [place] [address]  Dept: [phone]  Dept Fax: [phone]    Sacha Rojas  1955    Follow Up Office Visit Note    History of Present Illness:  Sacha Rojas is a 66 y.o. male who presents to the clinic for Follow-up. Ischemic cardiomyopathy- He denies any chest pain, SOB, edema, on Metoprolol 50 mg bid, Entresto 97/103 bid .Aldactone 25 mg and Farxiga, his echo yesterday Ef 35 to 40% , will keep same meds    The following portions of the patient's history were reviewed and updated as appropriate: allergies, current medications, past family history, past medical history, past social history, past surgical history and problem list.    Medications:  ASPIRIN 81 PO  Entresto tablet  ezetimibe  Farxiga tablet  metFORMIN  metoprolol tartrate  rosuvastatin  spironolactone  vitamin B-12  vitamin D3 tablet    Subjective  Allergies   Allergen Reactions   • Clopidogrel Bisulfate Hives   • Varenicline Tartrate Hives        Past Medical History:   Diagnosis Date   • Benign essential hypertension    • Body mass index (BMI)40.0-44.9, adult    • CAD (coronary artery disease) 2001    100% RCA 95% prox LAD   • COPD (chronic obstructive pulmonary disease) ([facility])    • Coronary arteriosclerosis in native artery    • Hypertension    • Impaired fasting blood sugar    • Ischemic cardiomyopathy     He seems doing fine, no chest pain, no SOB, no edema, he underwent cardiac cath with Moderate disase 60 to 70 % CX, LAD 60 to 70 % and %; will keep medical treatment Entresto 49.51 bid, Metoprolol 50 mg bid will add Farixga 10 mg daily.   • Mixed hyperlipidemia    • Obesity    • Tobacco dependence syndrome    • Tonsillitis    • Type 2 diabetes mellitus without complication, without long-term current use of insulin ([facility])    • Vitamin D deficiency        Past Surgical History:   Procedure Laterality Date   • ADENOIDECTOMY     • OTHER  "SURGICAL HISTORY      STENT PLACEMENT   • TONSILLECTOMY         Family History   Problem Relation Age of Onset   • Stomach cancer Mother    • Coronary artery disease Father    • Peripheral vascular disease Father    • Hypertension Father         Social History     Socioeconomic History   • Marital status:    Tobacco Use   • Smoking status: Heavy Tobacco Smoker     Types: Cigarettes   • Smokeless tobacco: Never Used   Vaping Use   • Vaping Use: Never used   Substance and Sexual Activity   • Alcohol use: Not Currently   • Drug use: Never   • Sexual activity: Defer       Review of Systems   Constitutional: Negative.    HENT: Negative.    Respiratory: Negative.    Cardiovascular: Negative.    Endocrine: Negative.    Genitourinary: Negative.    Musculoskeletal: Negative.    Skin: Negative.    Allergic/Immunologic: Negative.    Neurological: Negative.    Hematological: Negative.    Psychiatric/Behavioral: Negative.        Cardiovascular Procedures    ECHO/MUGA:   STRESS TESTS:   CARDIAC CATH:   DEVICES:   HOLTER:   CT/MRI:   VASCULAR:   CARDIOTHORACIC:     Objective  Vitals:    02/03/22 0926   BP: 144/76   BP Location: Right arm   Patient Position: Sitting   Cuff Size: Large Adult   Pulse: 69   Resp: 15   Temp: 97.1 °F (36.2 °C)   TempSrc: Infrared   SpO2: 96%   Weight: (!) 139 kg (306 lb)   Height: 170.2 cm (67\")   PainSc: 0-No pain     Body mass index is 47.93 kg/m².     Physical Exam  Constitutional:       Appearance: Healthy appearance. Not in distress.   Neck:      Vascular: No JVR. JVD normal.   Pulmonary:      Effort: Pulmonary effort is normal.      Breath sounds: Normal breath sounds. No wheezing. No rhonchi. No rales.   Chest:      Chest wall: Not tender to palpatation.   Cardiovascular:      PMI at left midclavicular line. Normal rate. Regular rhythm. Normal S1. Normal S2.      Murmurs: There is no murmur.      No gallop. No click. No rub.   Pulses:     Intact distal pulses.   Edema:     Peripheral edema " absent.   Abdominal:      General: Bowel sounds are normal.      Palpations: Abdomen is soft.      Tenderness: There is no abdominal tenderness.   Musculoskeletal: Normal range of motion.         General: No tenderness. Skin:     General: Skin is warm and dry.   Neurological:      General: No focal deficit present.      Mental Status: Alert and oriented to person, place and time.          Diagnostic Data  Procedures    Assessment and Plan  Diagnoses and all orders for this visit:    Ischemic cardiomyopathy- Will keep same meds, Entresto, Metoprolol,Aldactone and Farxiga . Ef about the same 35 to 40%     Essential hypertension- The BP is fine 110.80    Type 2 diabetes mellitus without complication, without long-term current use of insulin ([facility])    Tobacco use- Advised to quit    Hyperlipidemia LDL goal <70- LDL os 82, on Crestor 40 mg, Will add Zetia     Other orders  -     ezetimibe (ZETIA) 10 MG tablet; Take 1 tablet by mouth Daily.         No follow-ups on file.    Darrion Lam MD  02/03/2022

## 2022-02-23 ENCOUNTER — TELEPHONE (OUTPATIENT)
Dept: CARDIOLOGY | Facility: CLINIC | Age: 67
End: 2022-02-23

## 2022-02-23 NOTE — TELEPHONE ENCOUNTER
Spoke with Mr. Rojas and advised him that I received a denial from [employer] for his Entresto for the reason he has not met the 4100.00 out of pocket amount for prescription drugs.  He has only met 8.11.  I called [facility] and was advised it would cost 618.00 for 3 month supply and pt states he will be unable to do this.  He states he has been taking this since he started seeing Dr. Lam.  I have reached out to the drug rep for Entresto to see if any other avenues to take.

## 2022-02-24 NOTE — TELEPHONE ENCOUNTER
Please call him back to discuss entresto and [facility] also needing samples. With his does he needs to take two 49-51mg bid the highest dose is not sampled

## 2022-02-25 NOTE — TELEPHONE ENCOUNTER
Left a message for Mr. Rojas that I have put some samples at  for him I have reached out the Entresto rep about the denial and we are working on some other avenues.  I advise him when I know more.

## 2022-03-01 ENCOUNTER — TELEPHONE (OUTPATIENT)
Dept: CARDIOLOGY | Facility: CLINIC | Age: 67
End: 2022-03-01

## 2022-03-31 DIAGNOSIS — E11.9 TYPE 2 DIABETES MELLITUS WITHOUT COMPLICATION, WITHOUT LONG-TERM CURRENT USE OF INSULIN: ICD-10-CM

## 2022-03-31 RX ORDER — DAPAGLIFLOZIN 10 MG/1
10 TABLET, FILM COATED ORAL DAILY
Qty: 90 TABLET | Refills: 3 | Status: SHIPPED | OUTPATIENT
Start: 2022-03-31 | End: 2022-05-09 | Stop reason: SDUPTHER

## 2022-04-22 ENCOUNTER — TRANSCRIBE ORDERS (OUTPATIENT)
Dept: CT IMAGING | Facility: CLINIC | Age: 67
End: 2022-04-22

## 2022-04-22 DIAGNOSIS — R91.1 LUNG NODULE: Primary | ICD-10-CM

## 2022-04-26 ENCOUNTER — TELEPHONE (OUTPATIENT)
Dept: FAMILY MEDICINE CLINIC | Facility: CLINIC | Age: 67
End: 2022-04-26

## 2022-04-26 NOTE — TELEPHONE ENCOUNTER
Patient is scheduled w/Dr. Rios on 5/9.  He needs his metformin 500 mg filled because he will be completely out after today.  [facility]

## 2022-04-30 ENCOUNTER — OFFICE VISIT (OUTPATIENT)
Dept: FAMILY MEDICINE CLINIC | Facility: CLINIC | Age: 67
End: 2022-04-30

## 2022-04-30 VITALS
WEIGHT: 309 LBS | SYSTOLIC BLOOD PRESSURE: 122 MMHG | DIASTOLIC BLOOD PRESSURE: 82 MMHG | HEIGHT: 66 IN | OXYGEN SATURATION: 97 % | HEART RATE: 74 BPM | BODY MASS INDEX: 49.66 KG/M2

## 2022-04-30 DIAGNOSIS — R19.7 DIARRHEA OF PRESUMED INFECTIOUS ORIGIN: Primary | ICD-10-CM

## 2022-04-30 LAB
EXPIRATION DATE: NORMAL
INTERNAL CONTROL: NORMAL
Lab: NORMAL
SARS-COV-2 AG UPPER RESP QL IA.RAPID: NOT DETECTED

## 2022-04-30 PROCEDURE — 87426 SARSCOV CORONAVIRUS AG IA: CPT | Performed by: PHYSICIAN ASSISTANT

## 2022-04-30 PROCEDURE — 99213 OFFICE O/P EST LOW 20 MIN: CPT | Performed by: PHYSICIAN ASSISTANT

## 2022-04-30 RX ORDER — SACCHAROMYCES BOULARDII 250 MG
250 CAPSULE ORAL 2 TIMES DAILY
Qty: 60 CAPSULE | Refills: 1 | Status: SHIPPED | OUTPATIENT
Start: 2022-04-30 | End: 2022-10-03 | Stop reason: ALTCHOICE

## 2022-05-02 NOTE — ASSESSMENT & PLAN NOTE
Discussed constraints of todays visit with patient given it is a weekend visit no imaging or laboratory services available, patient acknowledged understanding.  Advised patient to increase fluids and bland diet. Prescribed probiotics. If symptoms continue or worsen then go to ER for further work up.

## 2022-05-02 NOTE — PROGRESS NOTES
"Chief Complaint  Diarrhea (Patient reports that he has had diarrhea since Tuesday.)    Subjective          Sacha Rojas presents to [facility] PRIMARY CARE  Patient reports he has had diarrhea daily for the past 5 days.  He does not know any inciting event.  Denies any fever, chills, nausea, or vomiting.  Reports his wife started with diarrhea today.Denies any diet modifications      Objective   Vital Signs:   /82 (BP Location: Right arm, Patient Position: Sitting, Cuff Size: Adult)   Pulse 74   Ht 167.6 cm (66\")   Wt (!) 140 kg (309 lb)   SpO2 97%   BMI 49.87 kg/m²     Physical Exam  Vitals and nursing note reviewed.   Constitutional:       General: He is not in acute distress.     Appearance: He is not ill-appearing.   Cardiovascular:      Rate and Rhythm: Normal rate and regular rhythm.      Pulses: Normal pulses.   Pulmonary:      Effort: Pulmonary effort is normal. No respiratory distress.   Abdominal:      General: Bowel sounds are increased. There is no distension.      Palpations: Abdomen is soft.      Tenderness: There is no abdominal tenderness. There is no guarding or rebound.   Musculoskeletal:         General: Normal range of motion.   Skin:     General: Skin is warm and dry.   Psychiatric:         Mood and Affect: Mood normal.        Result Review :                 Assessment and Plan    Diagnoses and all orders for this visit:    1. Diarrhea of presumed infectious origin (Primary)  Assessment & Plan:  Discussed constraints of todays visit with patient given it is a weekend visit no imaging or laboratory services available, patient acknowledged understanding.  Advised patient to increase fluids and bland diet. Prescribed probiotics. If symptoms continue or worsen then go to ER for further work up.    Orders:  -     saccharomyces boulardii (Florastor) 250 MG capsule; Take 1 capsule by mouth 2 (Two) Times a Day.  Dispense: 60 capsule; Refill: 1  -     COVID-19 RAPID " AG,VERITOR,COR/FRANCISCA/PAD/ROBI/MAD/MALCOLM/LAG/JUDY/ IN-HOUSE,DRY SWAB, 1-2 HR TAT - Swab, Nasal Cavity; Future  -     POCT SARS-CoV-2 Antigen CHARU             Follow Up   No follow-ups on file.  Patient was given instructions and counseling regarding his condition or for health maintenance advice. Please see specific information pulled into the AVS if appropriate.

## 2022-05-09 ENCOUNTER — OFFICE VISIT (OUTPATIENT)
Dept: FAMILY MEDICINE CLINIC | Facility: CLINIC | Age: 67
End: 2022-05-09

## 2022-05-09 VITALS
OXYGEN SATURATION: 98 % | HEART RATE: 60 BPM | SYSTOLIC BLOOD PRESSURE: 150 MMHG | WEIGHT: 310.6 LBS | HEIGHT: 66 IN | BODY MASS INDEX: 49.92 KG/M2 | DIASTOLIC BLOOD PRESSURE: 78 MMHG

## 2022-05-09 DIAGNOSIS — I10 PRIMARY HYPERTENSION: Primary | ICD-10-CM

## 2022-05-09 DIAGNOSIS — I25.10 CORONARY ARTERY DISEASE INVOLVING NATIVE CORONARY ARTERY OF NATIVE HEART WITHOUT ANGINA PECTORIS: ICD-10-CM

## 2022-05-09 DIAGNOSIS — E11.9 TYPE 2 DIABETES MELLITUS WITHOUT COMPLICATION, WITHOUT LONG-TERM CURRENT USE OF INSULIN: ICD-10-CM

## 2022-05-09 DIAGNOSIS — E53.8 B12 DEFICIENCY: ICD-10-CM

## 2022-05-09 DIAGNOSIS — E78.2 MIXED HYPERLIPIDEMIA: ICD-10-CM

## 2022-05-09 PROCEDURE — 99214 OFFICE O/P EST MOD 30 MIN: CPT | Performed by: FAMILY MEDICINE

## 2022-05-09 RX ORDER — ROSUVASTATIN CALCIUM 40 MG/1
40 TABLET, COATED ORAL DAILY
Qty: 90 TABLET | Refills: 3 | Status: SHIPPED | OUTPATIENT
Start: 2022-05-09 | End: 2022-10-03 | Stop reason: SDUPTHER

## 2022-05-09 RX ORDER — LANOLIN ALCOHOL/MO/W.PET/CERES
1000 CREAM (GRAM) TOPICAL DAILY
Qty: 90 TABLET | Refills: 1 | Status: SHIPPED | OUTPATIENT
Start: 2022-05-09 | End: 2022-11-01 | Stop reason: SDUPTHER

## 2022-05-09 RX ORDER — DAPAGLIFLOZIN 10 MG/1
10 TABLET, FILM COATED ORAL DAILY
Qty: 90 TABLET | Refills: 3 | Status: SHIPPED | OUTPATIENT
Start: 2022-05-09 | End: 2022-05-09

## 2022-05-09 RX ORDER — METOPROLOL TARTRATE 50 MG/1
50 TABLET, FILM COATED ORAL 2 TIMES DAILY
Qty: 180 TABLET | Refills: 3 | Status: SHIPPED | OUTPATIENT
Start: 2022-05-09 | End: 2022-10-03 | Stop reason: SDUPTHER

## 2022-05-09 RX ORDER — DAPAGLIFLOZIN 10 MG/1
10 TABLET, FILM COATED ORAL DAILY
Qty: 90 TABLET | Refills: 1 | Status: SHIPPED | OUTPATIENT
Start: 2022-05-09 | End: 2022-07-08

## 2022-05-09 NOTE — PROGRESS NOTES
"Chief Complaint  Hypertension, Diabetes, and Hyperlipidemia    Subjective          Sacha Rojas presents to [facility] PRIMARY CARE  Patient comes in today basically have his blood work looked at he recently with the hospital for GI upset and some pains he had a complete work-up tenderness of reported as normal.  He states he was given some medicines for diarrhea and is actually all gotten better since then to as well.  He states that he has had no other real chest pains or difficulties and shortness of breath is gotten a lot better over the last few months      Objective   Vital Signs:   /78   Pulse 60   Ht 167.6 cm (66\")   Wt (!) 141 kg (310 lb 9.6 oz)   SpO2 98%   BMI 50.13 kg/m²     Body mass index is 50.13 kg/m².    Review of Systems   Constitutional: Positive for fatigue.   HENT: Negative for congestion, dental problem, ear discharge, ear pain and sore throat.    Respiratory: Negative for apnea, chest tightness and shortness of breath.    Gastrointestinal: Positive for diarrhea and nausea. Negative for constipation.   Endocrine: Negative for polyuria.   Genitourinary: Negative for difficulty urinating.   Musculoskeletal: Positive for arthralgias. Negative for gait problem.   Skin: Negative for rash.   Hematological: Negative for adenopathy.       Past History:  Medical History: has a past medical history of Benign essential hypertension, Body mass index (BMI)40.0-44.9, adult, CAD (coronary artery disease) (2001), COPD (chronic obstructive pulmonary disease) (HCC), Coronary arteriosclerosis in native artery, Hypertension, Impaired fasting blood sugar, Ischemic cardiomyopathy, Mixed hyperlipidemia, Obesity, Tobacco dependence syndrome, Tonsillitis, Type 2 diabetes mellitus without complication, without long-term current use of insulin (HCC), and Vitamin D deficiency.   Surgical History: has a past surgical history that includes Tonsillectomy; Adenoidectomy; and Other surgical " history.         Current Outpatient Medications:   •  ASPIRIN 81 PO, Take 1 tablet by mouth Daily. Take one tablet by oral route daily, Disp: , Rfl:   •  Cholecalciferol (vitamin D3) 125 MCG (5000 UT) tablet tablet, Take 5,000 Units by mouth Daily. Take one tablet by oral route daily, Disp: , Rfl:   •  Dapagliflozin Propanediol (Farxiga) 10 MG tablet, Take 10 mg by mouth Daily. TAKE 1 TABLET BY ORAL ROUTE EVERY DAY IN THE MORNING, Disp: 90 tablet, Rfl: 1  •  ezetimibe (ZETIA) 10 MG tablet, Take 1 tablet by mouth Daily., Disp: 90 tablet, Rfl: 3  •  metFORMIN (GLUCOPHAGE) 500 MG tablet, Take 1 tablet by mouth 2 (Two) Times a Day With Meals. Take one tablet by oral route twice daily, Disp: 60 tablet, Rfl: 0  •  metoprolol tartrate (LOPRESSOR) 50 MG tablet, Take 1 tablet by mouth 2 (Two) Times a Day. Take one tablet by oral route twice daily, Disp: 180 tablet, Rfl: 3  •  rosuvastatin (Crestor) 40 MG tablet, Take 1 tablet by mouth Daily. Take one tablet by oral route daily, Disp: 90 tablet, Rfl: 3  •  saccharomyces boulardii (Florastor) 250 MG capsule, Take 1 capsule by mouth 2 (Two) Times a Day., Disp: 60 capsule, Rfl: 1  •  sacubitril-valsartan (Entresto)  MG tablet, Take 1 tablet by mouth 2 (Two) Times a Day., Disp: 180 tablet, Rfl: 3  •  spironolactone (ALDACTONE) 25 MG tablet, Take 1 tablet by mouth Daily., Disp: 90 tablet, Rfl: 3  •  vitamin B-12 (CYANOCOBALAMIN) 1000 MCG tablet, Take 1 tablet by mouth Daily. Take one tablet by oral route daily, Disp: 90 tablet, Rfl: 1    Allergies: Clopidogrel bisulfate and Varenicline tartrate    Physical Exam  Vitals reviewed.   Constitutional:       Appearance: Normal appearance.   HENT:      Head: Normocephalic.      Right Ear: Tympanic membrane, ear canal and external ear normal.      Left Ear: Tympanic membrane, ear canal and external ear normal.      Nose: Nose normal.      Mouth/Throat:      Pharynx: Oropharynx is clear.   Eyes:      Pupils: Pupils are equal, round,  and reactive to light.   Cardiovascular:      Rate and Rhythm: Normal rate and regular rhythm.      Pulses: Normal pulses.   Pulmonary:      Effort: Pulmonary effort is normal.      Breath sounds: Normal breath sounds.      Comments: Diminished breath sounds  Abdominal:      General: Abdomen is flat. Bowel sounds are normal.      Palpations: Abdomen is soft.   Musculoskeletal:         General: Normal range of motion.   Skin:     General: Skin is warm and dry.   Neurological:      General: No focal deficit present.      Mental Status: He is alert and oriented to person, place, and time.          Result Review :                   Assessment and Plan    Diagnoses and all orders for this visit:    1. Primary hypertension (Primary)  Comments:  Meds refilled and blood work ordered  Orders:  -     Basic Metabolic Panel; Future    2. Mixed hyperlipidemia  Comments:  Stable continue medications and monitor  Orders:  -     Lipid Panel; Future  -     rosuvastatin (Crestor) 40 MG tablet; Take 1 tablet by mouth Daily. Take one tablet by oral route daily  Dispense: 90 tablet; Refill: 3    3. B12 deficiency  Comments:  rePlacing p.o.  Orders:  -     Vitamin B12; Future  -     vitamin B-12 (CYANOCOBALAMIN) 1000 MCG tablet; Take 1 tablet by mouth Daily. Take one tablet by oral route daily  Dispense: 90 tablet; Refill: 1    4. Coronary artery disease involving native coronary artery of native heart without angina pectoris  Comments:  Stable seeing cardiology  Orders:  -     metoprolol tartrate (LOPRESSOR) 50 MG tablet; Take 1 tablet by mouth 2 (Two) Times a Day. Take one tablet by oral route twice daily  Dispense: 180 tablet; Refill: 3    5. Type 2 diabetes mellitus without complication, without long-term current use of insulin (HCC)  Comments:  Blood work and monitor  Orders:  -     Hemoglobin A1c; Future  -     Discontinue: Dapagliflozin Propanediol (Farxiga) 10 MG tablet; Take 10 mg by mouth Daily. TAKE 1 TABLET BY ORAL ROUTE  EVERY DAY IN THE MORNING  Dispense: 90 tablet; Refill: 3  -     metFORMIN (GLUCOPHAGE) 500 MG tablet; Take 1 tablet by mouth 2 (Two) Times a Day With Meals. Take one tablet by oral route twice daily  Dispense: 60 tablet; Refill: 0  -     Discontinue: Dapagliflozin Propanediol (Farxiga) 10 MG tablet; Take 10 mg by mouth Daily. TAKE 1 TABLET BY ORAL ROUTE EVERY DAY IN THE MORNING  Dispense: 90 tablet; Refill: 3  -     Dapagliflozin Propanediol (Farxiga) 10 MG tablet; Take 10 mg by mouth Daily. TAKE 1 TABLET BY ORAL ROUTE EVERY DAY IN THE MORNING  Dispense: 90 tablet; Refill: 1              Follow Up   Return in about 6 months (around 11/9/2022).  Patient was given instructions and counseling regarding his condition or for health maintenance advice. Please see specific information pulled into the AVS if appropriate.     Lloyd Rios MD

## 2022-05-10 LAB
BUN SERPL-MCNC: 13 MG/DL (ref 8–27)
BUN/CREAT SERPL: 16 (ref 10–24)
CALCIUM SERPL-MCNC: 9 MG/DL (ref 8.6–10.2)
CHLORIDE SERPL-SCNC: 102 MMOL/L (ref 96–106)
CHOLEST SERPL-MCNC: 111 MG/DL (ref 100–199)
CO2 SERPL-SCNC: 17 MMOL/L (ref 20–29)
CREAT SERPL-MCNC: 0.82 MG/DL (ref 0.76–1.27)
EGFRCR SERPLBLD CKD-EPI 2021: 97 ML/MIN/1.73
GLUCOSE SERPL-MCNC: 93 MG/DL (ref 65–99)
HBA1C MFR BLD: 6.6 % (ref 4.8–5.6)
HDLC SERPL-MCNC: 25 MG/DL
LDLC SERPL CALC-MCNC: 64 MG/DL (ref 0–99)
POTASSIUM SERPL-SCNC: 4.2 MMOL/L (ref 3.5–5.2)
SODIUM SERPL-SCNC: 139 MMOL/L (ref 134–144)
TRIGL SERPL-MCNC: 119 MG/DL (ref 0–149)
VIT B12 SERPL-MCNC: 1194 PG/ML (ref 232–1245)
VLDLC SERPL CALC-MCNC: 22 MG/DL (ref 5–40)

## 2022-05-24 ENCOUNTER — TELEPHONE (OUTPATIENT)
Dept: FAMILY MEDICINE CLINIC | Facility: CLINIC | Age: 67
End: 2022-05-24

## 2022-06-09 ENCOUNTER — TELEPHONE (OUTPATIENT)
Dept: CARDIOLOGY | Facility: CLINIC | Age: 67
End: 2022-06-09

## 2022-06-28 DIAGNOSIS — E11.9 TYPE 2 DIABETES MELLITUS WITHOUT COMPLICATION, WITHOUT LONG-TERM CURRENT USE OF INSULIN: ICD-10-CM

## 2022-06-28 RX ORDER — EZETIMIBE 10 MG/1
10 TABLET ORAL DAILY
Qty: 90 TABLET | Refills: 3 | Status: SHIPPED | OUTPATIENT
Start: 2022-06-28 | End: 2022-10-03 | Stop reason: SDUPTHER

## 2022-06-28 NOTE — TELEPHONE ENCOUNTER
GENNY PATIENT     patient needs Metformin 500mg Take 1 tablet by mouth 2 (Two) Times a Day With Meals. Take one tablet by oral route [facility]

## 2022-07-08 DIAGNOSIS — E11.9 TYPE 2 DIABETES MELLITUS WITHOUT COMPLICATION, WITHOUT LONG-TERM CURRENT USE OF INSULIN: ICD-10-CM

## 2022-07-08 RX ORDER — DAPAGLIFLOZIN 10 MG/1
10 TABLET, FILM COATED ORAL DAILY
Qty: 90 TABLET | Refills: 3 | Status: SHIPPED | OUTPATIENT
Start: 2022-07-08 | End: 2022-10-03 | Stop reason: SDUPTHER

## 2022-08-01 DIAGNOSIS — E11.9 TYPE 2 DIABETES MELLITUS WITHOUT COMPLICATION, WITHOUT LONG-TERM CURRENT USE OF INSULIN: ICD-10-CM

## 2022-08-01 NOTE — TELEPHONE ENCOUNTER
Caller: Sacha Rojas    Relationship: Self    Best call back number:[phone]    Requested Prescriptions:   Requested Prescriptions     Pending Prescriptions Disp Refills   • metFORMIN (GLUCOPHAGE) 500 MG tablet 60 tablet 0     Sig: Take 1 tablet by mouth 2 (Two) Times a Day With Meals.        Pharmacy where request should be sent: [facility] [address] - [phone] [facility] [phone] [facility]     Additional details provided by patient: COMPLETELY OUT OF MEDICATION     Does the patient have less than a 3 day supply:  [x] Yes  [] No    Iveth Carr Rep   08/01/22 10:31 EDT

## 2022-08-22 ENCOUNTER — TELEPHONE (OUTPATIENT)
Dept: FAMILY MEDICINE CLINIC | Facility: CLINIC | Age: 67
End: 2022-08-22

## 2022-08-22 DIAGNOSIS — E53.8 VITAMIN B12 DEFICIENCY: ICD-10-CM

## 2022-08-22 DIAGNOSIS — E55.9 VITAMIN D DEFICIENCY: ICD-10-CM

## 2022-08-22 DIAGNOSIS — Z12.5 PROSTATE CANCER SCREENING: ICD-10-CM

## 2022-08-22 DIAGNOSIS — E11.9 TYPE 2 DIABETES MELLITUS WITHOUT COMPLICATION, WITHOUT LONG-TERM CURRENT USE OF INSULIN: Primary | ICD-10-CM

## 2022-08-22 NOTE — TELEPHONE ENCOUNTER
Patient scheduled his 6 month recheck for 11/1/2022  He would like to do blood work prior to visit. No active orders in Epic.  Patient is requesting orders be put into Epic and to be contacted to schedule lab work once the orders are in the system.

## 2022-08-22 NOTE — TELEPHONE ENCOUNTER
Order in chart for fasting labs he can just stop in the week before his visit to do the blood work.

## 2022-08-29 DIAGNOSIS — E11.9 TYPE 2 DIABETES MELLITUS WITHOUT COMPLICATION, WITHOUT LONG-TERM CURRENT USE OF INSULIN: ICD-10-CM

## 2022-09-28 DIAGNOSIS — E11.9 TYPE 2 DIABETES MELLITUS WITHOUT COMPLICATION, WITHOUT LONG-TERM CURRENT USE OF INSULIN: ICD-10-CM

## 2022-10-03 ENCOUNTER — OFFICE VISIT (OUTPATIENT)
Dept: CARDIOLOGY | Facility: CLINIC | Age: 67
End: 2022-10-03

## 2022-10-03 VITALS
DIASTOLIC BLOOD PRESSURE: 62 MMHG | RESPIRATION RATE: 18 BRPM | TEMPERATURE: 97.5 F | SYSTOLIC BLOOD PRESSURE: 108 MMHG | HEIGHT: 66 IN | OXYGEN SATURATION: 97 % | HEART RATE: 61 BPM | WEIGHT: 310 LBS | BODY MASS INDEX: 49.82 KG/M2

## 2022-10-03 DIAGNOSIS — E78.2 MIXED HYPERLIPIDEMIA: ICD-10-CM

## 2022-10-03 DIAGNOSIS — Z72.0 TOBACCO USE: ICD-10-CM

## 2022-10-03 DIAGNOSIS — I25.5 ISCHEMIC CARDIOMYOPATHY: ICD-10-CM

## 2022-10-03 DIAGNOSIS — I25.10 CORONARY ARTERY DISEASE INVOLVING NATIVE CORONARY ARTERY OF NATIVE HEART WITHOUT ANGINA PECTORIS: ICD-10-CM

## 2022-10-03 DIAGNOSIS — E11.9 TYPE 2 DIABETES MELLITUS WITHOUT COMPLICATION, WITHOUT LONG-TERM CURRENT USE OF INSULIN: ICD-10-CM

## 2022-10-03 DIAGNOSIS — I50.22 CHRONIC HFREF (HEART FAILURE WITH REDUCED EJECTION FRACTION): ICD-10-CM

## 2022-10-03 DIAGNOSIS — I10 ESSENTIAL HYPERTENSION: Primary | ICD-10-CM

## 2022-10-03 DIAGNOSIS — E78.5 HYPERLIPIDEMIA LDL GOAL <70: ICD-10-CM

## 2022-10-03 PROCEDURE — 99214 OFFICE O/P EST MOD 30 MIN: CPT | Performed by: INTERNAL MEDICINE

## 2022-10-03 RX ORDER — SPIRONOLACTONE 25 MG/1
25 TABLET ORAL DAILY
Qty: 90 TABLET | Refills: 3 | Status: SHIPPED | OUTPATIENT
Start: 2022-10-03 | End: 2023-04-04 | Stop reason: SDUPTHER

## 2022-10-03 RX ORDER — ROSUVASTATIN CALCIUM 40 MG/1
40 TABLET, COATED ORAL DAILY
Qty: 90 TABLET | Refills: 3 | Status: SHIPPED | OUTPATIENT
Start: 2022-10-03 | End: 2023-04-04 | Stop reason: SDUPTHER

## 2022-10-03 RX ORDER — EZETIMIBE 10 MG/1
10 TABLET ORAL DAILY
Qty: 90 TABLET | Refills: 3 | Status: SHIPPED | OUTPATIENT
Start: 2022-10-03 | End: 2022-10-05

## 2022-10-03 RX ORDER — SACUBITRIL AND VALSARTAN 97; 103 MG/1; MG/1
1 TABLET, FILM COATED ORAL 2 TIMES DAILY
Qty: 180 TABLET | Refills: 3 | Status: SHIPPED | OUTPATIENT
Start: 2022-10-03 | End: 2022-10-03

## 2022-10-03 RX ORDER — DAPAGLIFLOZIN 10 MG/1
10 TABLET, FILM COATED ORAL DAILY
Qty: 90 TABLET | Refills: 3 | Status: SHIPPED | OUTPATIENT
Start: 2022-10-03 | End: 2022-10-03

## 2022-10-03 RX ORDER — DAPAGLIFLOZIN 10 MG/1
10 TABLET, FILM COATED ORAL DAILY
Qty: 90 TABLET | Refills: 3 | Status: SHIPPED | OUTPATIENT
Start: 2022-10-03 | End: 2022-10-06

## 2022-10-03 RX ORDER — SACUBITRIL AND VALSARTAN 97; 103 MG/1; MG/1
1 TABLET, FILM COATED ORAL 2 TIMES DAILY
Qty: 180 TABLET | Refills: 3 | Status: SHIPPED | OUTPATIENT
Start: 2022-10-03 | End: 2023-04-04 | Stop reason: SDUPTHER

## 2022-10-03 RX ORDER — METOPROLOL TARTRATE 50 MG/1
50 TABLET, FILM COATED ORAL 2 TIMES DAILY
Qty: 180 TABLET | Refills: 3 | Status: SHIPPED | OUTPATIENT
Start: 2022-10-03 | End: 2023-04-04 | Stop reason: SDUPTHER

## 2022-10-03 NOTE — PROGRESS NOTES
[facility] [place]  [facility] CARDIOLOGY  [address] [place] [address]  Dept: [phone]  Dept Fax: [phone]    Sacha Rojas  1955    Follow Up Office Visit Note    History of Present Illness:  Sacha Rojas is a 67 y.o. male who presents to the clinic for Follow-up. Non ischemic cardiomyopathy - Last Ef 35 to 40%, his cardiac cath moderate disease LAD and Cx with 100% RCA . No stents, no chest pain, no SOB, no edema, on Entresto 97,103 bid, Metoprolol 50 mg bid and Aldactone 25mg plus farxiga 10 mg,     The following portions of the patient's history were reviewed and updated as appropriate: allergies, current medications, past family history, past medical history, past social history, past surgical history and problem list.    Medications:  ASPIRIN 81 PO  Entresto tablet  ezetimibe  Farxiga tablet  metFORMIN  metoprolol tartrate  rosuvastatin  spironolactone  vitamin B-12  vitamin D3 tablet    Subjective  Allergies   Allergen Reactions   • Clopidogrel Bisulfate Hives   • Varenicline Tartrate Hives        Past Medical History:   Diagnosis Date   • Benign essential hypertension    • Body mass index (BMI)40.0-44.9, adult    • CAD (coronary artery disease) 2001    100% RCA 95% prox LAD   • COPD (chronic obstructive pulmonary disease) ([facility])    • Coronary arteriosclerosis in native artery    • Hypertension    • Impaired fasting blood sugar    • Ischemic cardiomyopathy     He seems doing fine, no chest pain, no SOB, no edema, he underwent cardiac cath with Moderate disase 60 to 70 % CX, LAD 60 to 70 % and %; will keep medical treatment Entresto 49.51 bid, Metoprolol 50 mg bid will add Farixga 10 mg daily.   • Mixed hyperlipidemia    • Obesity    • Tobacco dependence syndrome    • Tonsillitis    • Type 2 diabetes mellitus without complication, without long-term current use of insulin ([facility])    • Vitamin D deficiency        Past Surgical History:   Procedure  "Laterality Date   • ADENOIDECTOMY     • OTHER SURGICAL HISTORY      STENT PLACEMENT   • TONSILLECTOMY         Family History   Problem Relation Age of Onset   • Stomach cancer Mother    • Coronary artery disease Father    • Peripheral vascular disease Father    • Hypertension Father         Social History     Socioeconomic History   • Marital status:    Tobacco Use   • Smoking status: Heavy Tobacco Smoker     Types: Cigarettes   • Smokeless tobacco: Never Used   Vaping Use   • Vaping Use: Never used   Substance and Sexual Activity   • Alcohol use: Not Currently   • Drug use: Never   • Sexual activity: Defer       Review of Systems   Constitutional: Negative.    HENT: Negative.    Respiratory: Negative.    Cardiovascular: Negative.    Endocrine: Negative.    Genitourinary: Negative.    Musculoskeletal: Negative.    Skin: Negative.    Allergic/Immunologic: Negative.    Neurological: Negative.    Hematological: Negative.    Psychiatric/Behavioral: Negative.        Cardiovascular Procedures    ECHO/MUGA:   STRESS TESTS:   CARDIAC CATH:   DEVICES:   HOLTER:   CT/MRI:   VASCULAR:   CARDIOTHORACIC:     Objective  Vitals:    10/03/22 0846   BP: 108/62   BP Location: Right arm   Patient Position: Lying   Cuff Size: Large Adult   Pulse: 61   Resp: 18   Temp: 97.5 °F (36.4 °C)   TempSrc: Infrared   SpO2: 97%   Weight: (!) 141 kg (310 lb)   Height: 167.6 cm (66\")   PainSc: 0-No pain     Body mass index is 50.04 kg/m².     Physical Exam  Constitutional:       Appearance: Healthy appearance. Not in distress.   Neck:      Vascular: No JVR. JVD normal.   Pulmonary:      Effort: Pulmonary effort is normal.      Breath sounds: Normal breath sounds. No wheezing. No rhonchi. No rales.   Chest:      Chest wall: Not tender to palpatation.   Cardiovascular:      PMI at left midclavicular line. Normal rate. Regular rhythm. Normal S1. Normal S2.      Murmurs: There is no murmur.      No gallop. No click. No rub.   Pulses:     Intact " distal pulses.   Edema:     Peripheral edema absent.   Abdominal:      General: Bowel sounds are normal.      Palpations: Abdomen is soft.      Tenderness: There is no abdominal tenderness.   Musculoskeletal: Normal range of motion.         General: No tenderness. Skin:     General: Skin is warm and dry.   Neurological:      General: No focal deficit present.      Mental Status: Alert and oriented to person, place and time.          Diagnostic Data  Procedures    Assessment and Plan  Diagnoses and all orders for this visit:    Essential hypertension- The BP is 130.80, on Entresto 97.103 bid, Aldactone 25 mg,, Farxiga     Hyperlipidemia LDL goal <70- On Crestor 40 mg and Zetia  -     High Sensitivity CRP  -     Lipid Panel        Tobacco use- still smoking    Type 2 diabetes mellitus without complication, without long-term current use of insulin (HCC)    Chronic HFrEF (heart failure with reduced ejection fraction) (HCC)- Likely ischemia but no stents or CABG, moderate disease LAD and Cx and 100%  RCA, FFR were normal, on Entresto, Metoprolol, Aldactone, Farxiga, will get lab, last Ef 35 to 40% earlier this year  -     CBC & Differential  -     Comprehensive Metabolic Panel  -     proBNP  -     TSH  -     Troponin T; Future         Return in about 6 months (around 4/3/2023) for Recheck.    Darrion Lam MD  10/03/2022

## 2022-10-04 LAB
ALBUMIN SERPL-MCNC: 4.4 G/DL (ref 3.8–4.8)
ALBUMIN/GLOB SERPL: 1.8 {RATIO} (ref 1.2–2.2)
ALP SERPL-CCNC: 111 IU/L (ref 44–121)
ALT SERPL-CCNC: 32 IU/L (ref 0–44)
AST SERPL-CCNC: 19 IU/L (ref 0–40)
BASOPHILS # BLD AUTO: 0 X10E3/UL (ref 0–0.2)
BASOPHILS NFR BLD AUTO: 0 %
BILIRUB SERPL-MCNC: 0.3 MG/DL (ref 0–1.2)
BUN SERPL-MCNC: 20 MG/DL (ref 8–27)
BUN/CREAT SERPL: 20 (ref 10–24)
CALCIUM SERPL-MCNC: 9.3 MG/DL (ref 8.6–10.2)
CHLORIDE SERPL-SCNC: 102 MMOL/L (ref 96–106)
CHOLEST SERPL-MCNC: 142 MG/DL (ref 100–199)
CO2 SERPL-SCNC: 21 MMOL/L (ref 20–29)
CREAT SERPL-MCNC: 0.98 MG/DL (ref 0.76–1.27)
CRP SERPL HS-MCNC: 2.6 MG/L (ref 0–3)
EGFRCR SERPLBLD CKD-EPI 2021: 85 ML/MIN/1.73
EOSINOPHIL # BLD AUTO: 0.1 X10E3/UL (ref 0–0.4)
EOSINOPHIL NFR BLD AUTO: 1 %
ERYTHROCYTE [DISTWIDTH] IN BLOOD BY AUTOMATED COUNT: 16.4 % (ref 11.6–15.4)
GLOBULIN SER CALC-MCNC: 2.5 G/DL (ref 1.5–4.5)
GLUCOSE SERPL-MCNC: 99 MG/DL (ref 70–99)
HCT VFR BLD AUTO: 48.2 % (ref 37.5–51)
HDLC SERPL-MCNC: 44 MG/DL
HGB BLD-MCNC: 16.9 G/DL (ref 13–17.7)
IMM GRANULOCYTES # BLD AUTO: 0.1 X10E3/UL (ref 0–0.1)
IMM GRANULOCYTES NFR BLD AUTO: 1 %
LDLC SERPL CALC-MCNC: 81 MG/DL (ref 0–99)
LYMPHOCYTES # BLD AUTO: 2.5 X10E3/UL (ref 0.7–3.1)
LYMPHOCYTES NFR BLD AUTO: 24 %
MCH RBC QN AUTO: 30 PG (ref 26.6–33)
MCHC RBC AUTO-ENTMCNC: 35.1 G/DL (ref 31.5–35.7)
MCV RBC AUTO: 86 FL (ref 79–97)
MONOCYTES # BLD AUTO: 0.7 X10E3/UL (ref 0.1–0.9)
MONOCYTES NFR BLD AUTO: 6 %
NEUTROPHILS # BLD AUTO: 7.4 X10E3/UL (ref 1.4–7)
NEUTROPHILS NFR BLD AUTO: 68 %
NT-PROBNP SERPL-MCNC: 98 PG/ML (ref 0–376)
PLATELET # BLD AUTO: 194 X10E3/UL (ref 150–450)
POTASSIUM SERPL-SCNC: 4.5 MMOL/L (ref 3.5–5.2)
PROT SERPL-MCNC: 6.9 G/DL (ref 6–8.5)
RBC # BLD AUTO: 5.64 X10E6/UL (ref 4.14–5.8)
SODIUM SERPL-SCNC: 137 MMOL/L (ref 134–144)
TRIGL SERPL-MCNC: 87 MG/DL (ref 0–149)
TROPONIN T SERPL HS-MCNC: 12 NG/L (ref 0–22)
TSH SERPL DL<=0.005 MIU/L-ACNC: 2.9 UIU/ML (ref 0.45–4.5)
VLDLC SERPL CALC-MCNC: 17 MG/DL (ref 5–40)
WBC # BLD AUTO: 10.7 X10E3/UL (ref 3.4–10.8)

## 2022-10-05 ENCOUNTER — TELEPHONE (OUTPATIENT)
Dept: CARDIOLOGY | Facility: CLINIC | Age: 67
End: 2022-10-05

## 2022-10-05 NOTE — TELEPHONE ENCOUNTER
----- Message from Darrion Lam MD sent at 10/4/2022 12:06 PM EDT -----  Lab in general are fine but LDL is 81, as he has CAD we need much lower than that we can use repatha if he agrees

## 2022-10-05 NOTE — TELEPHONE ENCOUNTER
Left a message for Mr. Rojas that I was calling to go over recent lab work and to discuss his LDL needing to be 70 due to his CAD. Dr. aLm would like to use Repatha shots that you would give yourself every 14 days.  Please call back to discuss. Thank you.

## 2022-10-06 ENCOUNTER — TELEPHONE (OUTPATIENT)
Dept: CARDIOLOGY | Facility: CLINIC | Age: 67
End: 2022-10-06

## 2022-10-06 DIAGNOSIS — E11.9 TYPE 2 DIABETES MELLITUS WITHOUT COMPLICATION, WITHOUT LONG-TERM CURRENT USE OF INSULIN: ICD-10-CM

## 2022-10-06 RX ORDER — DAPAGLIFLOZIN 10 MG/1
10 TABLET, FILM COATED ORAL DAILY
Qty: 90 TABLET | Refills: 3 | Status: SHIPPED | OUTPATIENT
Start: 2022-10-06 | End: 2023-04-04 | Stop reason: SDUPTHER

## 2022-10-06 NOTE — TELEPHONE ENCOUNTER
PT WAS TOLD THERE IS A PROGRAM THE PHONE NUMBER IS [phone] . PT CAN CALL AND THEY WILL GO THRU THE APPLICATION WITH HIM . PT WILL CALL BACK FOR PHONE NUMBER . HUB CAN TELL PT

## 2022-10-06 NOTE — TELEPHONE ENCOUNTER
PT CALLED ABOUT PROGRAM FOR NEXLIZET, THEY TOLD HIM HE DID NOT QUALIFY BECAUSE HE DIDN'T WORK.  WHAT HIS HIS NEXT OPTION? DIFFERENT MEDICATION? PLEASE CALL

## 2022-10-06 NOTE — TELEPHONE ENCOUNTER
Caller: Sacha Rojas    Relationship: Self    Best call back number [phone]    What is the best time to reach you: ANYTIME    Who are you requesting to speak with (clinical staff, provider,  specific staff member): ANYONE        What was the call regarding: PT CALLED TELEPHONE # ABOUT PROGRAM FOR NEXLIZET. HE WAS TOLD HE DID NOT QUALIFY FOR PROGRAM BECAUSE HE WORKED. WHAT IS HIS NEXT OPTION? DIFFERENT MEDICATION?            Do you require a callback: YES

## 2022-10-06 NOTE — TELEPHONE ENCOUNTER
Pt stated their Nexlizet is too expensive. Would like some sort of assistance or hel. Please advise.

## 2022-10-07 ENCOUNTER — TELEPHONE (OUTPATIENT)
Dept: CARDIOLOGY | Facility: CLINIC | Age: 67
End: 2022-10-07

## 2022-10-07 NOTE — TELEPHONE ENCOUNTER
Telephone call to pt and he is not able to afford the medication and was denied patient assistance and is not eligible for use of a coupon card, please advise

## 2022-10-07 NOTE — TELEPHONE ENCOUNTER
Sorry, we might need shots but might be more expensive?/ if that what it is keep Zetia and Crestor and lose weight

## 2022-10-07 NOTE — TELEPHONE ENCOUNTER
TC to pt left message to try the coupon card that was given to the pt and to let us know the cost.

## 2022-10-07 NOTE — TELEPHONE ENCOUNTER
Caller: Sacha Rojas    Relationship: Self    Best call back number:     What is the best time to reach you: ANY    Who are you requesting to speak with (clinical staff, provider,  specific staff member): SHIRA      What was the call regarding: PTS DISCOUNT CARD WILL NOT WORK WITH HIS INSURANCE. ASSISTANCE LINE TOLD HIM THAT THEY COULDN'T HELP HIM B/C HE DOESN'T WORK A PUBLIC JOB. PLEASE CALL BACK AND DISCUSS MORE OPTIONS TO PAY FOR MEDICATION.     Do you require a callback: YES

## 2022-10-10 ENCOUNTER — TELEPHONE (OUTPATIENT)
Dept: CARDIOLOGY | Facility: CLINIC | Age: 67
End: 2022-10-10

## 2022-10-10 NOTE — TELEPHONE ENCOUNTER
Sorry, we might need shots but might be more expensive?/ if that what it is keep Zetia and Crestor and lose weight . Pt understood message and will restart the zetia tomorrow . Pt said thank you he will loose weight .

## 2022-10-26 DIAGNOSIS — E11.9 TYPE 2 DIABETES MELLITUS WITHOUT COMPLICATION, WITHOUT LONG-TERM CURRENT USE OF INSULIN: ICD-10-CM

## 2022-10-27 ENCOUNTER — LAB (OUTPATIENT)
Dept: FAMILY MEDICINE CLINIC | Facility: CLINIC | Age: 67
End: 2022-10-27

## 2022-10-27 ENCOUNTER — TELEPHONE (OUTPATIENT)
Dept: FAMILY MEDICINE CLINIC | Facility: CLINIC | Age: 67
End: 2022-10-27

## 2022-10-27 DIAGNOSIS — Z12.5 PROSTATE CANCER SCREENING: ICD-10-CM

## 2022-10-27 DIAGNOSIS — E11.9 TYPE 2 DIABETES MELLITUS WITHOUT COMPLICATION, WITHOUT LONG-TERM CURRENT USE OF INSULIN: ICD-10-CM

## 2022-10-27 DIAGNOSIS — E53.8 VITAMIN B12 DEFICIENCY: ICD-10-CM

## 2022-10-27 DIAGNOSIS — E55.9 VITAMIN D DEFICIENCY: ICD-10-CM

## 2022-10-27 PROCEDURE — 36415 COLL VENOUS BLD VENIPUNCTURE: CPT | Performed by: FAMILY MEDICINE

## 2022-10-27 NOTE — TELEPHONE ENCOUNTER
Caller: CATHY CEDILLO    Relationship: Emergency Contact    Best call back number: [phone]    What orders are you requesting (i.e. lab or imaging): BLOOD WORK    In what timeframe would the patient need to come in: PREFERABLY TODAY    Where will you receive your lab/imaging services: IN OFFICE    Additional notes: PATIENT WOULD LIKE TO KNOW IF HE NEEDS BLOOD WORK FOR HIS UPCOMING APPOINTMENT. IF SO HE WOULD LIKE THE ORDERS IN SO HE CAN COME IN TODAY.

## 2022-10-28 LAB
25(OH)D3+25(OH)D2 SERPL-MCNC: 80.1 NG/ML (ref 30–100)
ALBUMIN SERPL-MCNC: 4.3 G/DL (ref 3.8–4.8)
ALBUMIN/GLOB SERPL: 1.7 {RATIO} (ref 1.2–2.2)
ALP SERPL-CCNC: 111 IU/L (ref 44–121)
ALT SERPL-CCNC: 35 IU/L (ref 0–44)
AST SERPL-CCNC: 23 IU/L (ref 0–40)
BASOPHILS # BLD AUTO: 0 X10E3/UL (ref 0–0.2)
BASOPHILS NFR BLD AUTO: 0 %
BILIRUB SERPL-MCNC: 0.5 MG/DL (ref 0–1.2)
BUN SERPL-MCNC: 18 MG/DL (ref 8–27)
BUN/CREAT SERPL: 17 (ref 10–24)
CALCIUM SERPL-MCNC: 9.3 MG/DL (ref 8.6–10.2)
CHLORIDE SERPL-SCNC: 99 MMOL/L (ref 96–106)
CHOLEST SERPL-MCNC: 131 MG/DL (ref 100–199)
CO2 SERPL-SCNC: 21 MMOL/L (ref 20–29)
CREAT SERPL-MCNC: 1.07 MG/DL (ref 0.76–1.27)
EGFRCR SERPLBLD CKD-EPI 2021: 77 ML/MIN/1.73
EOSINOPHIL # BLD AUTO: 0.1 X10E3/UL (ref 0–0.4)
EOSINOPHIL NFR BLD AUTO: 1 %
ERYTHROCYTE [DISTWIDTH] IN BLOOD BY AUTOMATED COUNT: 16.5 % (ref 11.6–15.4)
GLOBULIN SER CALC-MCNC: 2.5 G/DL (ref 1.5–4.5)
GLUCOSE SERPL-MCNC: 91 MG/DL (ref 70–99)
HBA1C MFR BLD: 6.7 % (ref 4.8–5.6)
HCT VFR BLD AUTO: 50.8 % (ref 37.5–51)
HDLC SERPL-MCNC: 42 MG/DL
HGB BLD-MCNC: 17 G/DL (ref 13–17.7)
IMM GRANULOCYTES # BLD AUTO: 0.1 X10E3/UL (ref 0–0.1)
IMM GRANULOCYTES NFR BLD AUTO: 1 %
LDLC SERPL CALC-MCNC: 70 MG/DL (ref 0–99)
LYMPHOCYTES # BLD AUTO: 2.2 X10E3/UL (ref 0.7–3.1)
LYMPHOCYTES NFR BLD AUTO: 25 %
MCH RBC QN AUTO: 29.3 PG (ref 26.6–33)
MCHC RBC AUTO-ENTMCNC: 33.5 G/DL (ref 31.5–35.7)
MCV RBC AUTO: 87 FL (ref 79–97)
MONOCYTES # BLD AUTO: 0.6 X10E3/UL (ref 0.1–0.9)
MONOCYTES NFR BLD AUTO: 7 %
NEUTROPHILS # BLD AUTO: 5.6 X10E3/UL (ref 1.4–7)
NEUTROPHILS NFR BLD AUTO: 66 %
PLATELET # BLD AUTO: 156 X10E3/UL (ref 150–450)
POTASSIUM SERPL-SCNC: 4.6 MMOL/L (ref 3.5–5.2)
PROT SERPL-MCNC: 6.8 G/DL (ref 6–8.5)
PSA SERPL-MCNC: 0.5 NG/ML (ref 0–4)
RBC # BLD AUTO: 5.81 X10E6/UL (ref 4.14–5.8)
SODIUM SERPL-SCNC: 137 MMOL/L (ref 134–144)
TRIGL SERPL-MCNC: 103 MG/DL (ref 0–149)
TSH SERPL DL<=0.005 MIU/L-ACNC: 3.01 UIU/ML (ref 0.45–4.5)
VIT B12 SERPL-MCNC: 1267 PG/ML (ref 232–1245)
VLDLC SERPL CALC-MCNC: 19 MG/DL (ref 5–40)
WBC # BLD AUTO: 8.6 X10E3/UL (ref 3.4–10.8)

## 2022-11-01 ENCOUNTER — OFFICE VISIT (OUTPATIENT)
Dept: FAMILY MEDICINE CLINIC | Facility: CLINIC | Age: 67
End: 2022-11-01

## 2022-11-01 VITALS
OXYGEN SATURATION: 97 % | HEIGHT: 66 IN | WEIGHT: 310 LBS | DIASTOLIC BLOOD PRESSURE: 78 MMHG | SYSTOLIC BLOOD PRESSURE: 130 MMHG | BODY MASS INDEX: 49.82 KG/M2 | HEART RATE: 60 BPM

## 2022-11-01 DIAGNOSIS — I50.22 CHRONIC HFREF (HEART FAILURE WITH REDUCED EJECTION FRACTION): ICD-10-CM

## 2022-11-01 DIAGNOSIS — E11.9 TYPE 2 DIABETES MELLITUS WITHOUT COMPLICATION, WITHOUT LONG-TERM CURRENT USE OF INSULIN: Primary | ICD-10-CM

## 2022-11-01 DIAGNOSIS — I25.5 ISCHEMIC CARDIOMYOPATHY: ICD-10-CM

## 2022-11-01 DIAGNOSIS — E53.8 B12 DEFICIENCY: ICD-10-CM

## 2022-11-01 DIAGNOSIS — Z23 NEEDS FLU SHOT: ICD-10-CM

## 2022-11-01 DIAGNOSIS — Z12.11 SCREENING FOR COLON CANCER: ICD-10-CM

## 2022-11-01 LAB — POC MICROALBUMIN URINE: 100

## 2022-11-01 PROCEDURE — 82044 UR ALBUMIN SEMIQUANTITATIVE: CPT | Performed by: FAMILY MEDICINE

## 2022-11-01 PROCEDURE — 99214 OFFICE O/P EST MOD 30 MIN: CPT | Performed by: FAMILY MEDICINE

## 2022-11-01 PROCEDURE — G0008 ADMIN INFLUENZA VIRUS VAC: HCPCS | Performed by: FAMILY MEDICINE

## 2022-11-01 PROCEDURE — 90662 IIV NO PRSV INCREASED AG IM: CPT | Performed by: FAMILY MEDICINE

## 2022-11-01 RX ORDER — LANOLIN ALCOHOL/MO/W.PET/CERES
1000 CREAM (GRAM) TOPICAL DAILY
Qty: 90 TABLET | Refills: 1 | Status: SHIPPED | OUTPATIENT
Start: 2022-11-01

## 2022-11-01 NOTE — PROGRESS NOTES
"Chief Complaint  Diabetes (Follow up appointment/)    Subjective    History of Present Illness:  Sacha Rojas is a 67 y.o. male who presents today for follow-up regarding diabetes, hypertension, coronary artery disease, and recent fasting labs.    Doing well with ongoing cardiology follow-up related to his coronary artery disease, chronic congestive heart failure, hyperlipidemia, and hypertension.    A1c remains good under 7 on combination of low-dose metformin and Farxiga.    Discussed annual wellness visit and health maintenance/screening options.  He is up-to-date with his low-dose lung CT.  We discussed vaccines and he is willing for his flu shot today but declines other health maintenance and screening at this time.    He declines doing his annual wellness visit here given he prefers to have it done at his house with his insurance company.    Objective   Vital Signs:   /78 (BP Location: Left arm, Patient Position: Sitting, Cuff Size: Adult)   Pulse 60   Ht 167.6 cm (66\")   Wt (!) 141 kg (310 lb)   SpO2 97%   BMI 50.04 kg/m²     Review of Systems   Constitutional: Negative for appetite change, chills and fever.   HENT: Negative for hearing loss.    Eyes: Negative for blurred vision.   Respiratory: Negative for chest tightness.    Cardiovascular: Negative for chest pain.   Gastrointestinal: Negative for abdominal pain.   Musculoskeletal: Positive for arthralgias. Negative for gait problem.        Bilateral knee osteoarthritis.   Skin: Negative for rash.   Psychiatric/Behavioral: Negative for depressed mood.       Past History:  Medical History: has a past medical history of Benign essential hypertension, Body mass index (BMI)40.0-44.9, adult, CAD (coronary artery disease) (2001), COPD (chronic obstructive pulmonary disease) (HCC), Coronary arteriosclerosis in native artery, Hypertension, Impaired fasting blood sugar, Ischemic cardiomyopathy, Mixed hyperlipidemia, Obesity, Tobacco dependence " syndrome, Tonsillitis, Type 2 diabetes mellitus without complication, without long-term current use of insulin (HCC), and Vitamin D deficiency.   Surgical History: has a past surgical history that includes Tonsillectomy; Adenoidectomy; and Other surgical history.   Family History: family history includes Coronary artery disease in his father; Hypertension in his father; Peripheral vascular disease in his father; Stomach cancer in his mother.   Social History: reports that he has been smoking cigarettes. He has a 100.00 pack-year smoking history. He has never used smokeless tobacco. He reports that he does not currently use alcohol. He reports that he does not use drugs.      Current Outpatient Medications:   •  ASPIRIN 81 PO, Take 1 tablet by mouth Daily. Take one tablet by oral route daily, Disp: , Rfl:   •  Bempedoic Acid-Ezetimibe 180-10 MG tablet, Take 180 mg by mouth Daily., Disp: 90 tablet, Rfl: 1  •  Cholecalciferol (vitamin D3) 125 MCG (5000 UT) tablet tablet, Take 5,000 Units by mouth Daily. Take one tablet by oral route daily, Disp: , Rfl:   •  dapagliflozin Propanediol (Farxiga) 10 MG tablet, Take 10 mg by mouth Daily. TAKE 1 TABLET BY ORAL ROUTE EVERY DAY IN THE MORNING, Disp: 90 tablet, Rfl: 3  •  metFORMIN (GLUCOPHAGE) 500 MG tablet, Take 1 tablet by mouth 2 (Two) Times a Day With Meals., Disp: 180 tablet, Rfl: 1  •  metoprolol tartrate (LOPRESSOR) 50 MG tablet, Take 1 tablet by mouth 2 (Two) Times a Day. Take one tablet by oral route twice daily, Disp: 180 tablet, Rfl: 3  •  rosuvastatin (Crestor) 40 MG tablet, Take 1 tablet by mouth Daily. Take one tablet by oral route daily, Disp: 90 tablet, Rfl: 3  •  sacubitril-valsartan (Entresto)  MG tablet, Take 1 tablet by mouth 2 (Two) Times a Day., Disp: 180 tablet, Rfl: 3  •  spironolactone (ALDACTONE) 25 MG tablet, Take 1 tablet by mouth Daily., Disp: 90 tablet, Rfl: 3  •  vitamin B-12 (CYANOCOBALAMIN) 1000 MCG tablet, Take 1 tablet by mouth Daily.  Take one tablet by oral route daily, Disp: 90 tablet, Rfl: 1    Allergies: Clopidogrel bisulfate and Varenicline tartrate    Physical Exam  Constitutional:       Appearance: He is obese.   HENT:      Head: Normocephalic.      Right Ear: External ear normal.      Left Ear: External ear normal.      Nose: Nose normal.   Eyes:      Pupils: Pupils are equal, round, and reactive to light.   Cardiovascular:      Rate and Rhythm: Normal rate and regular rhythm.      Heart sounds: Normal heart sounds.   Pulmonary:      Effort: Pulmonary effort is normal.      Breath sounds: Normal breath sounds.   Musculoskeletal:      Cervical back: Normal range of motion.      Comments: Bilateral knee crepitus.  Stable but slow gait   Skin:     General: Skin is warm and dry.   Neurological:      General: No focal deficit present.      Mental Status: He is alert.   Psychiatric:         Mood and Affect: Mood normal.         Behavior: Behavior normal.         Thought Content: Thought content normal.          Result Review                   Assessment and Plan  Diagnoses and all orders for this visit:    1. Type 2 diabetes mellitus without complication, without long-term current use of insulin (HCC) (Primary)  Comments:  Blood work and monitor  Assessment & Plan:  Diabetes is improving with treatment.   Continue current treatment regimen.  Diabetes will be reassessed in 6 months.    Orders:  -     metFORMIN (GLUCOPHAGE) 500 MG tablet; Take 1 tablet by mouth 2 (Two) Times a Day With Meals.  Dispense: 180 tablet; Refill: 1  -     vitamin B-12 (CYANOCOBALAMIN) 1000 MCG tablet; Take 1 tablet by mouth Daily. Take one tablet by oral route daily  Dispense: 90 tablet; Refill: 1  -     POCT microalbumin    2. B12 deficiency  Comments:  rePlacing p.o.  Orders:  -     vitamin B-12 (CYANOCOBALAMIN) 1000 MCG tablet; Take 1 tablet by mouth Daily. Take one tablet by oral route daily  Dispense: 90 tablet; Refill: 1    3. Needs flu shot  -     Fluzone  High-Dose 65+yrs ([id number])    4. Screening for colon cancer  -     Cologuard - Stool, Per Rectum; Future    5. Chronic HFrEF (heart failure with reduced ejection fraction) ([facility])  Assessment & Plan:  Doing well with current regimen.  Reviewed BMP dated together.  Continue current regimen from cardiology      6. Ischemic cardiomyopathy  Assessment & Plan:  Good follow-up ongoing with Dr. Lam.  Recent normal BNP        Class 3 Severe Obesity (BMI >=40). Obesity-related health conditions include the following: hypertension, coronary heart disease, diabetes mellitus, dyslipidemias and osteoarthritis. Obesity is unchanged. BMI is is above average; BMI management plan is completed. We discussed portion control and increasing exercise.          Follow Up  Return in about 6 months (around 5/1/2023) for Med recheck.    Carl Solano MD

## 2022-11-01 NOTE — ASSESSMENT & PLAN NOTE
Doing well with current regimen.  Reviewed BMP dated together.  Continue current regimen from cardiology

## 2022-11-23 DIAGNOSIS — Z12.11 SCREENING FOR COLON CANCER: ICD-10-CM

## 2022-11-23 DIAGNOSIS — R19.5 POSITIVE COLORECTAL CANCER SCREENING USING COLOGUARD TEST: Primary | ICD-10-CM

## 2022-12-03 ENCOUNTER — OFFICE VISIT (OUTPATIENT)
Dept: FAMILY MEDICINE CLINIC | Facility: CLINIC | Age: 67
End: 2022-12-03

## 2022-12-03 VITALS
SYSTOLIC BLOOD PRESSURE: 110 MMHG | OXYGEN SATURATION: 94 % | DIASTOLIC BLOOD PRESSURE: 64 MMHG | HEART RATE: 65 BPM | BODY MASS INDEX: 50.34 KG/M2 | HEIGHT: 66 IN | TEMPERATURE: 97.5 F | WEIGHT: 313.2 LBS

## 2022-12-03 DIAGNOSIS — T36.95XA ALLERGIC REACTION DUE TO ANTIBACTERIAL DRUG: Primary | ICD-10-CM

## 2022-12-03 PROCEDURE — 96372 THER/PROPH/DIAG INJ SC/IM: CPT | Performed by: PHYSICIAN ASSISTANT

## 2022-12-03 PROCEDURE — 99213 OFFICE O/P EST LOW 20 MIN: CPT | Performed by: PHYSICIAN ASSISTANT

## 2022-12-03 RX ORDER — TRIAMCINOLONE ACETONIDE 40 MG/ML
80 INJECTION, SUSPENSION INTRA-ARTICULAR; INTRAMUSCULAR ONCE
Status: COMPLETED | OUTPATIENT
Start: 2022-12-03 | End: 2022-12-03

## 2022-12-03 RX ADMIN — TRIAMCINOLONE ACETONIDE 80 MG: 40 INJECTION, SUSPENSION INTRA-ARTICULAR; INTRAMUSCULAR at 10:57

## 2022-12-03 NOTE — ASSESSMENT & PLAN NOTE
I am giving him a steroid shot today then Rx for steroid taper, he is not having difficulty breathing but cautioned if that were to happen he needs to go to the ER.

## 2022-12-03 NOTE — PROGRESS NOTES
"Chief Complaint  Rash (Neck, face, back, symptoms started on Thursday, complains of itching )    Subjective          Sacha Rojas presents to [facility]  History of Present Illness  Patient reports that he broke out with a rash 2 days ago and it is spreading, itching and he recently was put on Clindamycin for an infection in his elbow.      Objective   Vital Signs:   /64 (BP Location: Left arm, Patient Position: Sitting, Cuff Size: Large Adult)   Pulse 65   Temp 97.5 °F (36.4 °C) (Temporal)   Ht 167.6 cm (66\")   Wt (!) 142 kg (313 lb 3.2 oz)   SpO2 94%   BMI 50.55 kg/m²     Body mass index is 50.55 kg/m².    Review of Systems   Constitutional: Negative for fatigue.   Eyes: Negative for blurred vision.   Respiratory: Negative for cough, chest tightness and shortness of breath.    Cardiovascular: Negative for chest pain, palpitations and leg swelling.   Gastrointestinal: Negative for abdominal pain, constipation, diarrhea, nausea and vomiting.   Skin: Positive for rash.   Neurological: Negative for dizziness, tremors and headache.   Psychiatric/Behavioral: Negative for depressed mood. The patient is not nervous/anxious.        Past History:  Medical History: has a past medical history of Benign essential hypertension, Body mass index (BMI)40.0-44.9, adult, CAD (coronary artery disease) (2001), COPD (chronic obstructive pulmonary disease) (HCC), Coronary arteriosclerosis in native artery, Hypertension, Impaired fasting blood sugar, Ischemic cardiomyopathy, Mixed hyperlipidemia, Obesity, Tobacco dependence syndrome, Tonsillitis, Type 2 diabetes mellitus without complication, without long-term current use of insulin (HCC), and Vitamin D deficiency.   Surgical History: has a past surgical history that includes Tonsillectomy; Adenoidectomy; and Other surgical history.   Family History: family history includes Coronary artery disease in his father; Hypertension in his " father; Peripheral vascular disease in his father; Stomach cancer in his mother.   Social History: reports that he has been smoking cigarettes. He has a 100.00 pack-year smoking history. He has never used smokeless tobacco. He reports that he does not currently use alcohol. He reports that he does not use drugs.      Current Outpatient Medications:   •  ASPIRIN 81 PO, Take 1 tablet by mouth Daily. Take one tablet by oral route daily, Disp: , Rfl:   •  Bempedoic Acid-Ezetimibe 180-10 MG tablet, Take 180 mg by mouth Daily., Disp: 90 tablet, Rfl: 1  •  Cholecalciferol (vitamin D3) 125 MCG (5000 UT) tablet tablet, Take 5,000 Units by mouth Daily. Take one tablet by oral route daily, Disp: , Rfl:   •  dapagliflozin Propanediol (Farxiga) 10 MG tablet, Take 10 mg by mouth Daily. TAKE 1 TABLET BY ORAL ROUTE EVERY DAY IN THE MORNING, Disp: 90 tablet, Rfl: 3  •  metFORMIN (GLUCOPHAGE) 500 MG tablet, Take 1 tablet by mouth 2 (Two) Times a Day With Meals., Disp: 180 tablet, Rfl: 1  •  metoprolol tartrate (LOPRESSOR) 50 MG tablet, Take 1 tablet by mouth 2 (Two) Times a Day. Take one tablet by oral route twice daily, Disp: 180 tablet, Rfl: 3  •  rosuvastatin (Crestor) 40 MG tablet, Take 1 tablet by mouth Daily. Take one tablet by oral route daily, Disp: 90 tablet, Rfl: 3  •  sacubitril-valsartan (Entresto)  MG tablet, Take 1 tablet by mouth 2 (Two) Times a Day., Disp: 180 tablet, Rfl: 3  •  spironolactone (ALDACTONE) 25 MG tablet, Take 1 tablet by mouth Daily., Disp: 90 tablet, Rfl: 3  •  vitamin B-12 (CYANOCOBALAMIN) 1000 MCG tablet, Take 1 tablet by mouth Daily. Take one tablet by oral route daily, Disp: 90 tablet, Rfl: 1    Current Facility-Administered Medications:   •  triamcinolone acetonide (KENALOG-40) injection 80 mg, 80 mg, Intramuscular, Once, Means, Edilma, PA-C    Allergies: Clopidogrel bisulfate, Clindamycin/lincomycin, and Varenicline tartrate    Physical Exam  Constitutional:       Appearance: He is obese.    Skin:     Findings: Rash: trunk, arms, legs.    Neurological:      Mental Status: He is alert and oriented to person, place, and time.   Psychiatric:         Behavior: Behavior normal.          Result Review :{Labs  Result Review  Imaging  Med Tab  Media  Procedures :23}                   Assessment and Plan    Diagnoses and all orders for this visit:    1. Allergic reaction due to antibacterial drug (Primary)  Assessment & Plan:  I am giving him a steroid shot today then Rx for steroid taper, he is not having difficulty breathing but cautioned if that were to happen he needs to go to the ER.     Orders:  -     triamcinolone acetonide (KENALOG-40) injection 80 mg      Follow Up   No follow-ups on file.  Patient was given instructions and counseling regarding his condition or for health maintenance advice. Please see specific information pulled into the AVS if appropriate.     Edilma Michael PA-C

## 2022-12-05 ENCOUNTER — TELEPHONE (OUTPATIENT)
Dept: FAMILY MEDICINE CLINIC | Facility: CLINIC | Age: 67
End: 2022-12-05

## 2022-12-05 RX ORDER — TRIAMCINOLONE ACETONIDE 40 MG/ML
80 INJECTION, SUSPENSION INTRA-ARTICULAR; INTRAMUSCULAR ONCE
Status: SHIPPED | OUTPATIENT
Start: 2022-12-05

## 2022-12-05 RX ORDER — PREDNISONE 20 MG/1
TABLET ORAL
Qty: 20 TABLET | Refills: 0 | Status: SHIPPED | OUTPATIENT
Start: 2022-12-05 | End: 2022-12-16

## 2022-12-05 RX ORDER — HYDROXYZINE 50 MG/1
50 TABLET, FILM COATED ORAL 3 TIMES DAILY PRN
Qty: 30 TABLET | Refills: 0 | Status: SHIPPED | OUTPATIENT
Start: 2022-12-05

## 2022-12-05 NOTE — TELEPHONE ENCOUNTER
Patient's wife states that the patient was seen Saturday by Lori Means and was supposed to have medication sent to [facility].  The pharmacy never received a prescription.  She states that the rash is worse.  It's on his chest and face.  Patient requested to be seen today and was scheduled. He is requesting to be seen sooner than 2:45pm. She would like a call back. Ph: [phone].

## 2022-12-12 ENCOUNTER — PREP FOR SURGERY (OUTPATIENT)
Dept: OTHER | Facility: HOSPITAL | Age: 67
End: 2022-12-12

## 2022-12-12 DIAGNOSIS — Z12.11 SCREENING FOR COLON CANCER: Primary | ICD-10-CM

## 2023-01-10 DIAGNOSIS — Z12.11 SCREENING FOR COLON CANCER: Primary | ICD-10-CM

## 2023-02-03 ENCOUNTER — PRE-ADMISSION TESTING (OUTPATIENT)
Dept: PREADMISSION TESTING | Facility: HOSPITAL | Age: 68
End: 2023-02-03
Payer: MEDICARE

## 2023-02-03 LAB
DEPRECATED RDW RBC AUTO: 59.7 FL (ref 37–54)
ERYTHROCYTE [DISTWIDTH] IN BLOOD BY AUTOMATED COUNT: 17.8 % (ref 12.3–15.4)
HCT VFR BLD AUTO: 49.8 % (ref 37.5–51)
HGB BLD-MCNC: 16.6 G/DL (ref 13–17.7)
MCH RBC QN AUTO: 30.8 PG (ref 26.6–33)
MCHC RBC AUTO-ENTMCNC: 33.3 G/DL (ref 31.5–35.7)
MCV RBC AUTO: 92.4 FL (ref 79–97)
PLATELET # BLD AUTO: 145 10*3/MM3 (ref 140–450)
PMV BLD AUTO: 9.6 FL (ref 6–12)
POTASSIUM SERPL-SCNC: 4.9 MMOL/L (ref 3.5–5.2)
QT INTERVAL: 376 MS
QTC INTERVAL: 381 MS
RBC # BLD AUTO: 5.39 10*6/MM3 (ref 4.14–5.8)
WBC NRBC COR # BLD: 9.15 10*3/MM3 (ref 3.4–10.8)

## 2023-02-03 PROCEDURE — 36415 COLL VENOUS BLD VENIPUNCTURE: CPT

## 2023-02-03 PROCEDURE — 93005 ELECTROCARDIOGRAM TRACING: CPT

## 2023-02-03 PROCEDURE — 85027 COMPLETE CBC AUTOMATED: CPT

## 2023-02-03 PROCEDURE — 93010 ELECTROCARDIOGRAM REPORT: CPT | Performed by: INTERNAL MEDICINE

## 2023-02-03 PROCEDURE — 84132 ASSAY OF SERUM POTASSIUM: CPT

## 2023-02-03 RX ORDER — EZETIMIBE 10 MG/1
20 TABLET ORAL DAILY
COMMUNITY

## 2023-02-03 NOTE — PAT
An arrival time for procedure was not provided during PAT visit. If patient had any questions or concerns about their arrival time, they were instructed to contact their surgeon/physician.  Additionally, if the patient referred to an arrival time that was acquired from their my chart account, patient was encouraged to verify that time with their surgeon/physician. Arrival times are NOT provided in Pre Admission Testing Department.    Pt states instructions from Dr. Dunn's office state not to stop ASA.     Per Anesthesia Request, patient instructed not to take their ACE/ARB medications on the AM of surgery.

## 2023-02-06 ENCOUNTER — ANESTHESIA EVENT (OUTPATIENT)
Dept: GASTROENTEROLOGY | Facility: HOSPITAL | Age: 68
End: 2023-02-06
Payer: MEDICARE

## 2023-02-07 ENCOUNTER — ANESTHESIA (OUTPATIENT)
Dept: GASTROENTEROLOGY | Facility: HOSPITAL | Age: 68
End: 2023-02-07
Payer: MEDICARE

## 2023-02-07 ENCOUNTER — HOSPITAL ENCOUNTER (OUTPATIENT)
Facility: HOSPITAL | Age: 68
Setting detail: HOSPITAL OUTPATIENT SURGERY
Discharge: HOME | End: 2023-02-07
Attending: INTERNAL MEDICINE | Admitting: INTERNAL MEDICINE
Payer: MEDICARE

## 2023-02-07 VITALS
DIASTOLIC BLOOD PRESSURE: 85 MMHG | OXYGEN SATURATION: 96 % | TEMPERATURE: 98 F | SYSTOLIC BLOOD PRESSURE: 126 MMHG | BODY MASS INDEX: 52.13 KG/M2 | HEART RATE: 55 BPM | WEIGHT: 315 LBS | RESPIRATION RATE: 16 BRPM

## 2023-02-07 DIAGNOSIS — Z12.11 SCREENING FOR COLON CANCER: ICD-10-CM

## 2023-02-07 LAB — GLUCOSE BLDC GLUCOMTR-MCNC: 105 MG/DL (ref 70–130)

## 2023-02-07 PROCEDURE — 45385 COLONOSCOPY W/LESION REMOVAL: CPT | Performed by: INTERNAL MEDICINE

## 2023-02-07 PROCEDURE — 82962 GLUCOSE BLOOD TEST: CPT | Performed by: FAMILY MEDICINE

## 2023-02-07 PROCEDURE — 25010000002 PROPOFOL 10 MG/ML EMULSION

## 2023-02-07 PROCEDURE — 88305 TISSUE EXAM BY PATHOLOGIST: CPT | Performed by: INTERNAL MEDICINE

## 2023-02-07 PROCEDURE — 45390 COLONOSCOPY W/RESECTION: CPT | Performed by: INTERNAL MEDICINE

## 2023-02-07 PROCEDURE — 45388 COLONOSCOPY W/ABLATION: CPT | Performed by: INTERNAL MEDICINE

## 2023-02-07 PROCEDURE — S0260 H&P FOR SURGERY: HCPCS | Performed by: INTERNAL MEDICINE

## 2023-02-07 RX ORDER — SODIUM CHLORIDE 9 MG/ML
40 INJECTION, SOLUTION INTRAVENOUS AS NEEDED
Status: CANCELLED | OUTPATIENT
Start: 2023-02-07

## 2023-02-07 RX ORDER — FAMOTIDINE 10 MG/ML
20 INJECTION, SOLUTION INTRAVENOUS ONCE
Status: COMPLETED | OUTPATIENT
Start: 2023-02-07 | End: 2023-02-07

## 2023-02-07 RX ORDER — SODIUM CHLORIDE, SODIUM LACTATE, POTASSIUM CHLORIDE, CALCIUM CHLORIDE 600; 310; 30; 20 MG/100ML; MG/100ML; MG/100ML; MG/100ML
INJECTION, SOLUTION INTRAVENOUS CONTINUOUS PRN
Status: DISCONTINUED | OUTPATIENT
Start: 2023-02-07 | End: 2023-02-07 | Stop reason: SURG

## 2023-02-07 RX ORDER — MIDAZOLAM HYDROCHLORIDE 1 MG/ML
0.5 INJECTION INTRAMUSCULAR; INTRAVENOUS
Status: CANCELLED | OUTPATIENT
Start: 2023-02-07

## 2023-02-07 RX ORDER — SODIUM CHLORIDE 0.9 % (FLUSH) 0.9 %
10 SYRINGE (ML) INJECTION EVERY 12 HOURS SCHEDULED
Status: CANCELLED | OUTPATIENT
Start: 2023-02-07

## 2023-02-07 RX ORDER — SODIUM CHLORIDE 0.9 % (FLUSH) 0.9 %
10 SYRINGE (ML) INJECTION AS NEEDED
Status: DISCONTINUED | OUTPATIENT
Start: 2023-02-07 | End: 2023-02-07 | Stop reason: HOSPADM

## 2023-02-07 RX ORDER — SODIUM CHLORIDE, SODIUM LACTATE, POTASSIUM CHLORIDE, CALCIUM CHLORIDE 600; 310; 30; 20 MG/100ML; MG/100ML; MG/100ML; MG/100ML
9 INJECTION, SOLUTION INTRAVENOUS CONTINUOUS
Status: CANCELLED | OUTPATIENT
Start: 2023-02-07

## 2023-02-07 RX ORDER — SODIUM CHLORIDE 9 MG/ML
75 INJECTION, SOLUTION INTRAVENOUS ONCE
Status: COMPLETED | OUTPATIENT
Start: 2023-02-07 | End: 2023-02-07

## 2023-02-07 RX ORDER — PROPOFOL 10 MG/ML
VIAL (ML) INTRAVENOUS AS NEEDED
Status: DISCONTINUED | OUTPATIENT
Start: 2023-02-07 | End: 2023-02-07 | Stop reason: SURG

## 2023-02-07 RX ORDER — ONDANSETRON 2 MG/ML
4 INJECTION INTRAMUSCULAR; INTRAVENOUS ONCE AS NEEDED
Status: DISCONTINUED | OUTPATIENT
Start: 2023-02-07 | End: 2023-02-07 | Stop reason: HOSPADM

## 2023-02-07 RX ORDER — FAMOTIDINE 20 MG/1
20 TABLET, FILM COATED ORAL ONCE
Status: CANCELLED | OUTPATIENT
Start: 2023-02-07 | End: 2023-02-07

## 2023-02-07 RX ORDER — LIDOCAINE HYDROCHLORIDE 10 MG/ML
0.5 INJECTION, SOLUTION EPIDURAL; INFILTRATION; INTRACAUDAL; PERINEURAL ONCE AS NEEDED
Status: CANCELLED | OUTPATIENT
Start: 2023-02-07

## 2023-02-07 RX ORDER — IPRATROPIUM BROMIDE AND ALBUTEROL SULFATE 2.5; .5 MG/3ML; MG/3ML
3 SOLUTION RESPIRATORY (INHALATION) ONCE AS NEEDED
Status: DISCONTINUED | OUTPATIENT
Start: 2023-02-07 | End: 2023-02-07 | Stop reason: HOSPADM

## 2023-02-07 RX ADMIN — PROPOFOL 150 MCG/KG/MIN: 10 INJECTION, EMULSION INTRAVENOUS at 10:49

## 2023-02-07 RX ADMIN — PROPOFOL 50 MG: 10 INJECTION, EMULSION INTRAVENOUS at 10:49

## 2023-02-07 RX ADMIN — SODIUM CHLORIDE, POTASSIUM CHLORIDE, SODIUM LACTATE AND CALCIUM CHLORIDE: 600; 310; 30; 20 INJECTION, SOLUTION INTRAVENOUS at 10:44

## 2023-02-07 RX ADMIN — Medication 10 ML: at 09:50

## 2023-02-07 RX ADMIN — SODIUM CHLORIDE 75 ML/HR: 9 INJECTION, SOLUTION INTRAVENOUS at 09:51

## 2023-02-07 RX ADMIN — FAMOTIDINE 20 MG: 10 INJECTION INTRAVENOUS at 09:50

## 2023-02-07 NOTE — H&P
[facility]    Referring Provider: Estevan Dunn MD    Reason for Consultation: here for colonoscopy    Chief complaint here for colonoscopy    History of present illness:  Sacha Rojas is a 67 y.o. male who presents to inpatient endoscopy for endoscopy.  H/o nonischemic cardiomyopathy, follows with Dr. Lam. PCP is Dr. Mccurdy who sent him for health care maintenance.    Allergies:  Clopidogrel bisulfate, Clindamycin/lincomycin, and Varenicline tartrate    Scheduled Meds:        Infusions:       PRN Meds:  •  sodium chloride    Home Meds:  Facility-Administered Medications Prior to Admission   Medication Dose Route Frequency Provider Last Rate Last Admin   • triamcinolone acetonide (KENALOG-40) injection 80 mg  80 mg Intramuscular Once Means, FRED [place]         Medications Prior to Admission   Medication Sig Dispense Refill Last Dose   • ASPIRIN 81 PO Take 2 tablets by mouth Daily.   2/6/2023   • Cholecalciferol (vitamin D3) 125 MCG (5000 UT) tablet tablet Take 5,000 Units by mouth Daily. Take one tablet by oral route daily   2/6/2023   • dapagliflozin Propanediol (Farxiga) 10 MG tablet Take 10 mg by mouth Daily. TAKE 1 TABLET BY ORAL ROUTE EVERY DAY IN THE MORNING 90 tablet 3 2/6/2023   • ezetimibe (ZETIA) 10 MG tablet Take 20 mg by mouth Daily.   2/6/2023   • hydrOXYzine (ATARAX) 50 MG tablet Take 1 tablet by mouth 3 (Three) Times a Day As Needed for Itching. 30 tablet 0 Past Month   • metFORMIN (GLUCOPHAGE) 500 MG tablet Take 1 tablet by mouth 2 (Two) Times a Day With Meals. 180 tablet 1 2/6/2023   • metoprolol tartrate (LOPRESSOR) 50 MG tablet Take 1 tablet by mouth 2 (Two) Times a Day. Take one tablet by oral route twice daily 180 tablet 3 2/7/2023 at 0630   • rosuvastatin (Crestor) 40 MG tablet Take 1 tablet by mouth Daily. Take one tablet by oral route daily 90 tablet 3 2/6/2023   • sacubitril-valsartan (Entresto)  MG tablet Take 1 tablet by mouth 2 (Two) Times a Day. 180  tablet 3 2/6/2023   • Sod Picosulfate-Mag Ox-Cit Acd 10-3.5-12 MG-GM -GM/160ML solution Take 160 mL by mouth Take As Directed for 2 doses. 320 mL 0 2/6/2023   • spironolactone (ALDACTONE) 25 MG tablet Take 1 tablet by mouth Daily. 90 tablet 3 2/7/2023   • vitamin B-12 (CYANOCOBALAMIN) 1000 MCG tablet Take 1 tablet by mouth Daily. Take one tablet by oral route daily 90 tablet 1 2/6/2023   • Bempedoic Acid-Ezetimibe 180-10 MG tablet Take 180 mg by mouth Daily. 90 tablet 1        ROS: Review of Systems  All other systems reviewed and are negative.    PAST MED HX: Pt  has a past medical history of Benign essential hypertension, Body mass index (BMI)40.0-44.9, adult, CAD (coronary artery disease) (2001), COPD (chronic obstructive pulmonary disease) (HCC), Coronary arteriosclerosis in native artery, Hypertension, Impaired fasting blood sugar, Ischemic cardiomyopathy, Mixed hyperlipidemia, Obesity, Tobacco dependence syndrome, Tonsillitis, Type 2 diabetes mellitus without complication, without long-term current use of insulin (HCC), and Vitamin D deficiency.  PAST SURG HX: Pt  has a past surgical history that includes Tonsillectomy; Adenoidectomy; Other surgical history; and Colonoscopy.  FAM HX: family history includes Coronary artery disease in his father; Hypertension in his father; Peripheral vascular disease in his father; Stomach cancer in his mother.  SOC HX: Pt  reports that he has been smoking cigarettes. He has a 50.00 pack-year smoking history. He has quit using smokeless tobacco.  His smokeless tobacco use included chew. He reports that he does not currently use alcohol. He reports that he does not use drugs.    /67 (BP Location: Left arm, Patient Position: Lying)   Pulse 56   Temp 96.9 °F (36.1 °C) (Temporal)   Resp 18   Wt (!) 147 kg (323 lb)   SpO2 94%   BMI 52.13 kg/m²     Physical Exam  Wt Readings from Last 3 Encounters:   02/07/23 (!) 147 kg (323 lb)   12/03/22 (!) 142 kg (313 lb 3.2 oz)    11/01/22 (!) 141 kg (310 lb)   ,body mass index is 52.13 kg/m².    General Appearance:  Vitals as above. no acute distress  Head/face:  Normocephalic, atraumatic  Eyes:   EOMI, no conjunctivitis or icterus   Nose/Sinuses:  Nares patent bilaterally without discharge or lesions  Mouth/Throat:  Normal oral movements without dyskinesia  Neck:  trachea is midline, no thyromegaly  Lungs:  Normal work of breathing effort, no overt rales  Heart:  Regular rate, no overt palpable thrill or grade VI M  Abdomen:  Nondistended, no guarding or rebound tenderness  Neurologic:  Alert; no focal deficits; age appropriate behavior and speech  Psychiatric: mood and affect are congruent  Vascular: extremities without edema  Skin: no rash or cyanosis.          Results Review:   I reviewed the patient's new clinical results.    Lab Results   Component Value Date    WBC 9.15 02/03/2023    HGB 16.6 02/03/2023    HCT 49.8 02/03/2023    MCV 92.4 02/03/2023     02/03/2023       Lab Results   Component Value Date    GLUCOSE 91 10/27/2022    BUN 18 10/27/2022    CREATININE 1.07 10/27/2022    EGFRIFNONA 72 01/28/2022    EGFRIFAFRI 83 01/28/2022    BCR 17 10/27/2022    CO2 21 10/27/2022    CALCIUM 9.3 10/27/2022    PROTENTOTREF 6.8 10/27/2022    ALBUMIN 4.3 10/27/2022    LABIL2 1.7 10/27/2022    AST 23 10/27/2022    ALT 35 10/27/2022       ASSESSMENTS/PLANS  1.) HCM  2.) Nonischemic cardiomyopathy  To colonoscopy  The risk of endoscopy was discussed including the risk of anesthesia, bowel perforation, bleeding, missed lesion, infection, and death should a severe complication occur.  The patient determined that the diagnostic benefit outweighed the risk.             I discussed the patient's findings and my recommendations with the patient    Estevan Dunn MD  02/07/23  10:23 EST     30 steps

## 2023-02-07 NOTE — ANESTHESIA PREPROCEDURE EVALUATION
Anesthesia Evaluation                  Airway   Mallampati: I  TM distance: >3 FB  Neck ROM: full  No difficulty expected  Dental      Pulmonary    (+) COPD,   Cardiovascular     ECG reviewed    (+) hypertension, CAD, hyperlipidemia,     ROS comment: Ef around 30%    Neuro/Psych  GI/Hepatic/Renal/Endo    (+) morbid obesity,  diabetes mellitus,     Musculoskeletal     Abdominal    Substance History      OB/GYN          Other                        Anesthesia Plan    ASA 4     general     intravenous induction     Anesthetic plan, risks, benefits, and alternatives have been provided, discussed and informed consent has been obtained with: patient.    Plan discussed with CRNA.        CODE STATUS:

## 2023-02-08 LAB
CYTO UR: NORMAL
LAB AP CASE REPORT: NORMAL
LAB AP CLINICAL INFORMATION: NORMAL
PATH REPORT.FINAL DX SPEC: NORMAL
PATH REPORT.GROSS SPEC: NORMAL

## 2023-04-04 ENCOUNTER — OFFICE VISIT (OUTPATIENT)
Dept: CARDIOLOGY | Facility: CLINIC | Age: 68
End: 2023-04-04
Payer: MEDICARE

## 2023-04-04 VITALS
DIASTOLIC BLOOD PRESSURE: 84 MMHG | OXYGEN SATURATION: 99 % | BODY MASS INDEX: 50.62 KG/M2 | WEIGHT: 315 LBS | HEIGHT: 66 IN | TEMPERATURE: 98 F | HEART RATE: 64 BPM | RESPIRATION RATE: 16 BRPM | SYSTOLIC BLOOD PRESSURE: 136 MMHG

## 2023-04-04 DIAGNOSIS — E11.9 TYPE 2 DIABETES MELLITUS WITHOUT COMPLICATION, WITHOUT LONG-TERM CURRENT USE OF INSULIN: ICD-10-CM

## 2023-04-04 DIAGNOSIS — I25.10 CORONARY ARTERY DISEASE INVOLVING NATIVE CORONARY ARTERY OF NATIVE HEART WITHOUT ANGINA PECTORIS: ICD-10-CM

## 2023-04-04 DIAGNOSIS — I10 ESSENTIAL HYPERTENSION: ICD-10-CM

## 2023-04-04 DIAGNOSIS — I25.5 ISCHEMIC CARDIOMYOPATHY: ICD-10-CM

## 2023-04-04 DIAGNOSIS — E78.5 HYPERLIPIDEMIA LDL GOAL <70: ICD-10-CM

## 2023-04-04 DIAGNOSIS — Z72.0 TOBACCO USE: ICD-10-CM

## 2023-04-04 DIAGNOSIS — I50.22 CHRONIC HFREF (HEART FAILURE WITH REDUCED EJECTION FRACTION): Primary | ICD-10-CM

## 2023-04-04 PROCEDURE — 3079F DIAST BP 80-89 MM HG: CPT | Performed by: INTERNAL MEDICINE

## 2023-04-04 PROCEDURE — 1160F RVW MEDS BY RX/DR IN RCRD: CPT | Performed by: INTERNAL MEDICINE

## 2023-04-04 PROCEDURE — 99214 OFFICE O/P EST MOD 30 MIN: CPT | Performed by: INTERNAL MEDICINE

## 2023-04-04 PROCEDURE — 1159F MED LIST DOCD IN RCRD: CPT | Performed by: INTERNAL MEDICINE

## 2023-04-04 PROCEDURE — 3075F SYST BP GE 130 - 139MM HG: CPT | Performed by: INTERNAL MEDICINE

## 2023-04-04 RX ORDER — METOPROLOL TARTRATE 50 MG/1
50 TABLET, FILM COATED ORAL 2 TIMES DAILY
Qty: 180 TABLET | Refills: 3 | Status: SHIPPED | OUTPATIENT
Start: 2023-04-04

## 2023-04-04 RX ORDER — DAPAGLIFLOZIN 10 MG/1
10 TABLET, FILM COATED ORAL DAILY
Qty: 90 TABLET | Refills: 3 | Status: SHIPPED | OUTPATIENT
Start: 2023-04-04

## 2023-04-04 RX ORDER — SPIRONOLACTONE 25 MG/1
25 TABLET ORAL DAILY
Qty: 90 TABLET | Refills: 3 | Status: SHIPPED | OUTPATIENT
Start: 2023-04-04

## 2023-04-04 RX ORDER — ROSUVASTATIN CALCIUM 40 MG/1
40 TABLET, COATED ORAL DAILY
Qty: 90 TABLET | Refills: 3 | Status: SHIPPED | OUTPATIENT
Start: 2023-04-04

## 2023-04-04 RX ORDER — SACUBITRIL AND VALSARTAN 97; 103 MG/1; MG/1
1 TABLET, FILM COATED ORAL 2 TIMES DAILY
Qty: 180 TABLET | Refills: 3 | Status: SHIPPED | OUTPATIENT
Start: 2023-04-04

## 2023-04-04 NOTE — PROGRESS NOTES
[facility] [place]  [facility] CARDIOLOGY  [address] [place] [address]  Dept: [phone]  Dept Fax: [phone]    Sacha Rojas  1955    Follow Up Office Visit Note    History of Present Illness:  Sacha Rojas is a 67 y.o. male who presents to the clinic for Follow-up. Ischemic cardiomyopathy-No major complaints, mild SOB on exertion, no edema, able to do all the activities in general class 1-2-On Entresto 97.103 bid Metoprolol 50 mg bid Aldactone 25 mg and Farxiga 10 mg, will keep same meds    The following portions of the patient's history were reviewed and updated as appropriate: allergies, current medications, past family history, past medical history, past social history, past surgical history, and problem list.    Medications:  ASPIRIN 81 PO  Entresto tablet  ezetimibe  Farxiga tablet  hydrOXYzine  metFORMIN  metoprolol tartrate  rosuvastatin  Sod Picosulfate-Mag Ox-Cit Acd solution  spironolactone  triamcinolone acetonide  vitamin B-12  vitamin D3 tablet    Subjective  Allergies   Allergen Reactions   • Clopidogrel Bisulfate Hives   • Clindamycin/Lincomycin Rash   • Varenicline Tartrate Hives        Past Medical History:   Diagnosis Date   • Benign essential hypertension    • Body mass index (BMI)40.0-44.9, adult    • CAD (coronary artery disease) 2001    100% RCA 95% prox LAD   • COPD (chronic obstructive pulmonary disease)    • Coronary arteriosclerosis in native artery    • Hypertension    • Impaired fasting blood sugar    • Ischemic cardiomyopathy     He seems doing fine, no chest pain, no SOB, no edema, he underwent cardiac cath with Moderate disase 60 to 70 % CX, LAD 60 to 70 % and %; will keep medical treatment Entresto 49.51 bid, Metoprolol 50 mg bid will add Farixga 10 mg daily.   • Mixed hyperlipidemia    • Obesity    • Tobacco dependence syndrome    • Tonsillitis    • Type 2 diabetes mellitus without complication, without long-term current  "use of insulin    • Vitamin D deficiency        Past Surgical History:   Procedure Laterality Date   • ADENOIDECTOMY     • COLONOSCOPY     • COLONOSCOPY N/A 2/7/2023    Procedure: COLONOSCOPY;  Surgeon: Estevan Dunn MD;  Location: [facility] ENDOSCOPY;  Service: Gastroenterology;  Laterality: N/A;   • OTHER SURGICAL HISTORY      STENT PLACEMENT  2001 and 2005   • TONSILLECTOMY         Family History   Problem Relation Age of Onset   • Stomach cancer Mother    • Coronary artery disease Father    • Peripheral vascular disease Father    • Hypertension Father         Social History     Socioeconomic History   • Marital status:    Tobacco Use   • Smoking status: Heavy Smoker     Packs/day: 1.00     Years: 50.00     Pack years: 50.00     Types: Cigarettes   • Smokeless tobacco: Former     Types: Chew   • Tobacco comments:     Up to 4 packs per day years ago.    Vaping Use   • Vaping Use: Never used   Substance and Sexual Activity   • Alcohol use: Not Currently   • Drug use: Never   • Sexual activity: Defer       Review of Systems   Constitutional: Negative.    HENT: Negative.    Respiratory: Positive for shortness of breath.    Cardiovascular: Negative.    Endocrine: Negative.    Genitourinary: Negative.    Musculoskeletal: Negative.    Skin: Negative.    Allergic/Immunologic: Negative.    Neurological: Negative.    Hematological: Negative.    Psychiatric/Behavioral: Negative.        Cardiovascular Procedures    ECHO/MUGA:  STRESS TESTS:   CARDIAC CATH:   DEVICES:   HOLTER:   CT/MRI:   VASCULAR:   CARDIOTHORACIC:     Objective  Vitals:    04/04/23 0825   BP: 136/84   BP Location: Right arm   Patient Position: Lying   Cuff Size: Adult   Pulse: 64   Resp: 16   Temp: 98 °F (36.7 °C)   TempSrc: Infrared   SpO2: 99%   Weight: (!) 149 kg (329 lb)   Height: 167.6 cm (66\")   PainSc: 0-No pain     Body mass index is 53.1 kg/m².     Physical Exam  Vitals reviewed.   Constitutional:       Appearance: Healthy appearance. " Not in distress.   Eyes:      Pupils: Pupils are equal, round, and reactive to light.   HENT:    Mouth/Throat:      Pharynx: Oropharynx is clear.   Neck:      Thyroid: Thyroid normal.      Vascular: No JVR. JVD normal.   Pulmonary:      Effort: Pulmonary effort is normal.      Breath sounds: Normal breath sounds. No wheezing. No rhonchi. No rales.   Chest:      Chest wall: Not tender to palpatation.   Cardiovascular:      PMI at left midclavicular line. Normal rate. Regular rhythm. Normal S1. Normal S2.      Murmurs: There is no murmur.      No gallop. No click. No rub.   Pulses:     Intact distal pulses.   Edema:     Peripheral edema absent.   Abdominal:      General: Bowel sounds are normal.      Palpations: Abdomen is soft.      Tenderness: There is no abdominal tenderness.   Musculoskeletal: Normal range of motion.         General: No tenderness.      Cervical back: Normal range of motion and neck supple. Skin:     General: Skin is warm and dry.   Neurological:      General: No focal deficit present.      Mental Status: Alert and oriented to person, place and time.          Diagnostic Data  Procedures    Assessment and Plan  Diagnoses and all orders for this visit:    Chronic HFrEF (heart failure with reduced ejection fraction)- Doing good, stable, will keep same meds, will get lab  -     CBC & Differential  -     Comprehensive Metabolic Panel  -     CK  -     TSH    Essential hypertension- BP is 120.80 on Entresto, Metoprolol and Aldactone BP is good 120.80    Hyperlipidemia LDL goal <70- On Crestor 40 mg and Zetia   -     CK  -     Lipid Panel  -     TSH    Ischemic cardiomyopathy- Stable , will keep medical treatment, last Ef 2020 35 to 405 by nuclear and echo,   -     sacubitril-valsartan (Entresto)  MG tablet; Take 1 tablet by mouth 2 (Two) Times a Day.    Tobacco use- Still smoking, advised to quit    Type 2 diabetes mellitus without complication, without long-term current use of insulin  -      dapagliflozin Propanediol (Farxiga) 10 MG tablet; Take 10 mg by mouth Daily. TAKE 1 TABLET BY ORAL ROUTE EVERY DAY IN THE MORNING        Coronary artery disease involving native coronary artery of native heart without angina pectoris- Moderate disease by cath , 1005 RCA and moderate LAD and RCA  Comments:  Stable seeing cardiology  Orders:  -     metoprolol tartrate (LOPRESSOR) 50 MG tablet; Take 1 tablet by mouth 2 (Two) Times a Day. Take one tablet by oral route twice daily      Other orders  -     spironolactone (ALDACTONE) 25 MG tablet; Take 1 tablet by mouth Daily.         Return in about 6 months (around 10/4/2023) for Recheck with Dr. Lam.    Darrion Lam MD  04/04/2023

## 2023-04-05 ENCOUNTER — TELEPHONE (OUTPATIENT)
Dept: CARDIOLOGY | Facility: CLINIC | Age: 68
End: 2023-04-05
Payer: MEDICARE

## 2023-04-05 LAB
ALBUMIN SERPL-MCNC: 4.2 G/DL (ref 3.8–4.8)
ALBUMIN/GLOB SERPL: 1.8 {RATIO} (ref 1.2–2.2)
ALP SERPL-CCNC: 102 IU/L (ref 44–121)
ALT SERPL-CCNC: 44 IU/L (ref 0–44)
AST SERPL-CCNC: 26 IU/L (ref 0–40)
BASOPHILS # BLD AUTO: 0 X10E3/UL (ref 0–0.2)
BASOPHILS NFR BLD AUTO: 0 %
BILIRUB SERPL-MCNC: 0.4 MG/DL (ref 0–1.2)
BUN SERPL-MCNC: 21 MG/DL (ref 8–27)
BUN/CREAT SERPL: 20 (ref 10–24)
CALCIUM SERPL-MCNC: 9.8 MG/DL (ref 8.6–10.2)
CHLORIDE SERPL-SCNC: 102 MMOL/L (ref 96–106)
CHOLEST SERPL-MCNC: 130 MG/DL (ref 100–199)
CK SERPL-CCNC: 36 U/L (ref 41–331)
CO2 SERPL-SCNC: 24 MMOL/L (ref 20–29)
CREAT SERPL-MCNC: 1.03 MG/DL (ref 0.76–1.27)
EGFRCR SERPLBLD CKD-EPI 2021: 80 ML/MIN/1.73
EOSINOPHIL # BLD AUTO: 0.1 X10E3/UL (ref 0–0.4)
EOSINOPHIL NFR BLD AUTO: 1 %
ERYTHROCYTE [DISTWIDTH] IN BLOOD BY AUTOMATED COUNT: 15.8 % (ref 11.6–15.4)
GLOBULIN SER CALC-MCNC: 2.4 G/DL (ref 1.5–4.5)
GLUCOSE SERPL-MCNC: 139 MG/DL (ref 70–99)
HCT VFR BLD AUTO: 48.1 % (ref 37.5–51)
HDLC SERPL-MCNC: 38 MG/DL
HGB BLD-MCNC: 16.2 G/DL (ref 13–17.7)
IMM GRANULOCYTES # BLD AUTO: 0.1 X10E3/UL (ref 0–0.1)
IMM GRANULOCYTES NFR BLD AUTO: 1 %
LDLC SERPL CALC-MCNC: 69 MG/DL (ref 0–99)
LYMPHOCYTES # BLD AUTO: 2.2 X10E3/UL (ref 0.7–3.1)
LYMPHOCYTES NFR BLD AUTO: 22 %
MCH RBC QN AUTO: 30.3 PG (ref 26.6–33)
MCHC RBC AUTO-ENTMCNC: 33.7 G/DL (ref 31.5–35.7)
MCV RBC AUTO: 90 FL (ref 79–97)
MONOCYTES # BLD AUTO: 0.6 X10E3/UL (ref 0.1–0.9)
MONOCYTES NFR BLD AUTO: 6 %
NEUTROPHILS # BLD AUTO: 6.9 X10E3/UL (ref 1.4–7)
NEUTROPHILS NFR BLD AUTO: 70 %
PLATELET # BLD AUTO: 184 X10E3/UL (ref 150–450)
POTASSIUM SERPL-SCNC: 5.2 MMOL/L (ref 3.5–5.2)
PROT SERPL-MCNC: 6.6 G/DL (ref 6–8.5)
RBC # BLD AUTO: 5.34 X10E6/UL (ref 4.14–5.8)
SODIUM SERPL-SCNC: 140 MMOL/L (ref 134–144)
TRIGL SERPL-MCNC: 130 MG/DL (ref 0–149)
TSH SERPL DL<=0.005 MIU/L-ACNC: 3.4 UIU/ML (ref 0.45–4.5)
VLDLC SERPL CALC-MCNC: 23 MG/DL (ref 5–40)
WBC # BLD AUTO: 9.9 X10E3/UL (ref 3.4–10.8)

## 2023-04-05 NOTE — TELEPHONE ENCOUNTER
Left a message for Mr. Rojas that overall his lab results are good. Your potassium level was normal but on the high side at 5.2.  The medication, Aldactone, will increase your K+ level.  Eat low K+ foods/meals over the next week or so.  Please call if any questions or concerns.

## 2023-04-05 NOTE — TELEPHONE ENCOUNTER
----- Message from Darrion Lam MD sent at 4/5/2023  8:40 AM EDT -----  Lab are good except K is 5,2 borderline eat low K meals, he is on Aldactone

## 2023-07-24 ENCOUNTER — OFFICE VISIT (OUTPATIENT)
Dept: FAMILY MEDICINE CLINIC | Facility: CLINIC | Age: 68
End: 2023-07-24
Payer: MEDICARE

## 2023-07-24 VITALS
HEART RATE: 65 BPM | TEMPERATURE: 98.2 F | SYSTOLIC BLOOD PRESSURE: 118 MMHG | DIASTOLIC BLOOD PRESSURE: 74 MMHG | BODY MASS INDEX: 53.26 KG/M2 | OXYGEN SATURATION: 97 % | WEIGHT: 315 LBS

## 2023-07-24 DIAGNOSIS — N39.0 ACUTE UTI: Primary | ICD-10-CM

## 2023-07-24 PROCEDURE — 1159F MED LIST DOCD IN RCRD: CPT | Performed by: PHYSICIAN ASSISTANT

## 2023-07-24 PROCEDURE — 1160F RVW MEDS BY RX/DR IN RCRD: CPT | Performed by: PHYSICIAN ASSISTANT

## 2023-07-24 PROCEDURE — 3074F SYST BP LT 130 MM HG: CPT | Performed by: PHYSICIAN ASSISTANT

## 2023-07-24 PROCEDURE — 3078F DIAST BP <80 MM HG: CPT | Performed by: PHYSICIAN ASSISTANT

## 2023-07-24 PROCEDURE — 99213 OFFICE O/P EST LOW 20 MIN: CPT | Performed by: PHYSICIAN ASSISTANT

## 2023-07-24 RX ORDER — CEFUROXIME AXETIL 500 MG/1
500 TABLET ORAL 2 TIMES DAILY
COMMUNITY

## 2023-07-24 NOTE — PROGRESS NOTES
"Chief Complaint  Follow-up (Hospital follow up, UTI 07/17/23, [facility] ) and Urinary Tract Infection    Subjective        Sacha Rojas presents to [facility] PRIMARY CARE  History of Present Illness  Patient reports today for follow-up secondary to going to ER last week on July 17.  Patient reports he had a few days of urinary frequency and urgency.  Patient states when he went to the ER he was diagnosed with a urinary tract infection.  Patient states there was no blood in his urine.  Patient reports he has been taking Ceftin daily and is feeling better.  Patient denies any fever chills, back pain, pain with urination or blood in his urine.  Patient reports he only drinks diet Mountain Dew.  Urinary Tract Infection       Objective   Vital Signs:  /74 (BP Location: Left arm, Patient Position: Sitting, Cuff Size: Large Adult)   Pulse 65   Temp 98.2 °F (36.8 °C) (Temporal)   Wt (!) 150 kg (330 lb)   SpO2 97%   BMI 53.26 kg/m²   Estimated body mass index is 53.26 kg/m² as calculated from the following:    Height as of 4/4/23: 167.6 cm (66\").    Weight as of this encounter: 150 kg (330 lb).             Physical Exam  Vitals and nursing note reviewed.   Constitutional:       General: He is not in acute distress.     Appearance: He is not ill-appearing.   HENT:      Mouth/Throat:      Pharynx: No oropharyngeal exudate.   Eyes:      Pupils: Pupils are equal, round, and reactive to light.   Cardiovascular:      Rate and Rhythm: Normal rate and regular rhythm.   Pulmonary:      Effort: Pulmonary effort is normal. No respiratory distress.   Abdominal:      Tenderness: There is no right CVA tenderness or left CVA tenderness.   Musculoskeletal:         General: Normal range of motion.   Skin:     General: Skin is warm and dry.   Psychiatric:         Mood and Affect: Mood normal.      Result Review :                   Assessment and Plan   Diagnoses and all orders for this " visit:    1. Acute UTI (Primary)  Assessment & Plan:  Patient will continue current treatment plan with Ceftin.  Discussed at length with patient the importance of better hydration patient reports he will start drinking Gatorade and cranberry juice.  Discussed signs of worsening symptoms advise ER should they occur.  Patient knowledge understanding               Follow Up   Return if symptoms worsen or fail to improve.  Patient was given instructions and counseling regarding his condition or for health maintenance advice. Please see specific information pulled into the AVS if appropriate.

## 2023-07-24 NOTE — ASSESSMENT & PLAN NOTE
Patient will continue current treatment plan with Ceftin.  Discussed at length with patient the importance of better hydration patient reports he will start drinking Gatorade and cranberry juice.  Discussed signs of worsening symptoms advise ER should they occur.  Patient knowledge understanding

## 2023-10-04 ENCOUNTER — OFFICE VISIT (OUTPATIENT)
Dept: CARDIOLOGY | Facility: CLINIC | Age: 68
End: 2023-10-04
Payer: MEDICARE

## 2023-10-04 VITALS
TEMPERATURE: 98 F | HEART RATE: 72 BPM | OXYGEN SATURATION: 99 % | WEIGHT: 315 LBS | DIASTOLIC BLOOD PRESSURE: 74 MMHG | HEIGHT: 66 IN | RESPIRATION RATE: 18 BRPM | SYSTOLIC BLOOD PRESSURE: 128 MMHG | BODY MASS INDEX: 50.62 KG/M2

## 2023-10-04 DIAGNOSIS — I50.22 CHRONIC HFREF (HEART FAILURE WITH REDUCED EJECTION FRACTION): Primary | ICD-10-CM

## 2023-10-04 DIAGNOSIS — I25.5 ISCHEMIC CARDIOMYOPATHY: ICD-10-CM

## 2023-10-04 DIAGNOSIS — E11.9 TYPE 2 DIABETES MELLITUS WITHOUT COMPLICATION, WITHOUT LONG-TERM CURRENT USE OF INSULIN: ICD-10-CM

## 2023-10-04 DIAGNOSIS — E78.5 HYPERLIPIDEMIA LDL GOAL <70: ICD-10-CM

## 2023-10-04 DIAGNOSIS — Z72.0 TOBACCO USE: ICD-10-CM

## 2023-10-04 DIAGNOSIS — I10 ESSENTIAL HYPERTENSION: ICD-10-CM

## 2023-10-04 RX ORDER — TORSEMIDE 10 MG/1
10 TABLET ORAL DAILY
Qty: 90 TABLET | Refills: 3 | Status: SHIPPED | OUTPATIENT
Start: 2023-10-04

## 2023-10-04 RX ORDER — EZETIMIBE 10 MG/1
10 TABLET ORAL DAILY
Qty: 90 TABLET | Refills: 3 | Status: SHIPPED | OUTPATIENT
Start: 2023-10-04

## 2023-10-04 NOTE — PROGRESS NOTES
[facility] [place]  [facility] CARDIOLOGY  [address] [place] [address]  Dept: [phone]  Dept Fax: [phone]    Sacha Rojas  1955    Follow Up Office Visit Note    History of Present Illness:  Sacha Rojas is a 68 y.o. male who presents to the clinic for Follow-up.Ischemic cardiomyopathy- He seems doing well no complaints, has had cath in 2022 with 100% RCA and Mild disease LAD and CX, Ef was 32% and last echo in 2022 Ef 35 to 40%,  denies any chest pain, SOB, has some edema, in his legs, on Entresto 97.,103 bid, Metoprolol 50 mg bid, Aldactone 25 mg, and Farxiga 10 mg, will add Torsemide 10 mg , BP is 120.80, ,    The following portions of the patient's history were reviewed and updated as appropriate: allergies, current medications, past family history, past medical history, past social history, past surgical history, and problem list.    Medications:  ASPIRIN 81 PO  cefuroxime  Entresto tablet  ezetimibe  Farxiga tablet  metFORMIN  metoprolol tartrate  rosuvastatin  spironolactone  torsemide  triamcinolone acetonide  vitamin B-12  vitamin D3 tablet    Subjective  Allergies   Allergen Reactions   • Clopidogrel Bisulfate Hives   • Clindamycin/Lincomycin Rash   • Varenicline Tartrate Hives        Past Medical History:   Diagnosis Date   • Benign essential hypertension    • Body mass index (BMI)40.0-44.9, adult    • CAD (coronary artery disease) 2001    100% RCA 95% prox LAD   • COPD (chronic obstructive pulmonary disease)    • Coronary arteriosclerosis in native artery    • Hypertension    • Impaired fasting blood sugar    • Ischemic cardiomyopathy     He seems doing fine, no chest pain, no SOB, no edema, he underwent cardiac cath with Moderate disase 60 to 70 % CX, LAD 60 to 70 % and %; will keep medical treatment Entresto 49.51 bid, Metoprolol 50 mg bid will add Farixga 10 mg daily.   • Mixed hyperlipidemia    • Obesity    • Tobacco dependence  "syndrome    • Tonsillitis    • Type 2 diabetes mellitus without complication, without long-term current use of insulin    • Vitamin D deficiency        Past Surgical History:   Procedure Laterality Date   • ADENOIDECTOMY     • COLONOSCOPY     • COLONOSCOPY N/A 2/7/2023    Procedure: COLONOSCOPY;  Surgeon: Estevan Dunn MD;  Location: [facility] ENDOSCOPY;  Service: Gastroenterology;  Laterality: N/A;   • OTHER SURGICAL HISTORY      STENT PLACEMENT  2001 and 2005   • TONSILLECTOMY         Family History   Problem Relation Age of Onset   • Stomach cancer Mother    • Coronary artery disease Father    • Peripheral vascular disease Father    • Hypertension Father         Social History     Socioeconomic History   • Marital status:    Tobacco Use   • Smoking status: Heavy Smoker     Packs/day: 1.00     Years: 50.00     Pack years: 50.00     Types: Cigarettes   • Smokeless tobacco: Former     Types: Chew   • Tobacco comments:     Up to 4 packs per day years ago.    Vaping Use   • Vaping Use: Never used   Substance and Sexual Activity   • Alcohol use: Not Currently   • Drug use: Never   • Sexual activity: Defer       Review of Systems   Constitutional: Negative.    HENT: Negative.     Respiratory: Negative.     Cardiovascular:  Positive for leg swelling.   Endocrine: Negative.    Genitourinary: Negative.    Musculoskeletal: Negative.    Skin: Negative.    Allergic/Immunologic: Negative.    Neurological: Negative.    Hematological: Negative.    Psychiatric/Behavioral: Negative.       Cardiovascular Procedures    ECHO/MUGA:  STRESS TESTS:   CARDIAC CATH:   DEVICES:   HOLTER:   CT/MRI:   VASCULAR:   CARDIOTHORACIC:     Objective  Vitals:    10/04/23 0848   BP: 128/74   BP Location: Right arm   Patient Position: Lying   Cuff Size: Adult   Pulse: 72   Resp: 18   Temp: 98 °F (36.7 °C)   TempSrc: Infrared   SpO2: 99%   Weight: (!) 147 kg (324 lb)   Height: 167.6 cm (66\")   PainSc: 0-No pain     Body mass index is 52.29 " kg/m².     Physical Exam  Vitals reviewed.   Constitutional:       Appearance: Healthy appearance. Not in distress.   Eyes:      Pupils: Pupils are equal, round, and reactive to light.   HENT:    Mouth/Throat:      Pharynx: Oropharynx is clear.   Neck:      Thyroid: Thyroid normal.      Vascular: No JVR. JVD normal.   Pulmonary:      Effort: Pulmonary effort is normal.      Breath sounds: Normal breath sounds. No wheezing. No rhonchi. No rales.   Chest:      Chest wall: Not tender to palpatation.   Cardiovascular:      PMI at left midclavicular line. Normal rate. Regular rhythm. Normal S1. Normal S2.       Murmurs: There is no murmur.      No gallop.  No click. No rub.   Pulses:     Intact distal pulses.      Carotid: 3+ bilaterally.     Radial: 3+ bilaterally.     Femoral: 3+ bilaterally.     Dorsalis pedis: 3+ bilaterally.     Posterior tibial: 3+ bilaterally.  Edema:     Thigh: bilateral 2+ edema of the thigh.     Pretibial: bilateral 2+ edema of the pretibial area.     Ankle: bilateral 2+ edema of the ankle.     Feet: bilateral 2+ edema of the feet.  Abdominal:      General: Bowel sounds are normal.      Palpations: Abdomen is soft.      Tenderness: There is no abdominal tenderness.   Musculoskeletal: Normal range of motion.         General: No tenderness.      Cervical back: Normal range of motion and neck supple. Skin:     General: Skin is warm and dry.   Neurological:      General: No focal deficit present.      Mental Status: Alert and oriented to person, place and time.        Diagnostic Data    ECG 12 Lead    Date/Time: 10/4/2023 11:25 AM  Performed by: Darrion Lam MD  Authorized by: Darrion Lam MD   Comparison: compared with previous ECG from 2/3/2023  Similar to previous ECG  Rhythm: sinus rhythm  Rate: normal  BPM: 68  QRS axis: normal  Other findings: poor R wave progression    Clinical impression: abnormal EKG        Assessment and Plan  Diagnoses and all orders for this  visit:    Chronic HFrEF (heart failure with reduced ejection fraction)- He seems stable except edema 2 plus, we took him on Maxzide before, he is on Aldactone 25 mg, will add Torsemide 10 mg, he is also on Entresto 97.103 bid, Metoprolol 50 mg bid and farxiga 10 mg     Essential hypertension- BP is 120.80 on Above meds    Hyperlipidemia LDL goal <70- On Crestor 40 mg, plus Zetia, tolerates well , last LDL 69    Ischemic cardiomyopathy- as above, on Entresto, metoprolol, Aldactone, farxiga    Tobacco use- Still smoking     Type 2 diabetes mellitus without complication, without long-term current use of insulin    Other orders  -     torsemide (DEMADEX) 10 MG tablet; Take 1 tablet by mouth Daily.  -     ezetimibe (ZETIA) 10 MG tablet; Take 1 tablet by mouth Daily.         Return in about 3 months (around 1/4/2024) for Recheck with Dr. Lam.    Darrion Lam MD  10/04/2023

## 2023-10-28 DIAGNOSIS — E11.9 TYPE 2 DIABETES MELLITUS WITHOUT COMPLICATION, WITHOUT LONG-TERM CURRENT USE OF INSULIN: ICD-10-CM

## 2023-11-03 ENCOUNTER — TELEPHONE (OUTPATIENT)
Dept: CARDIOLOGY | Facility: CLINIC | Age: 68
End: 2023-11-03
Payer: MEDICARE

## 2023-11-03 NOTE — TELEPHONE ENCOUNTER
READ OFF NOTES FOR PT AND TRIED TO CALL YOUR EXTENSION - PT SAID HE IS GETTING IT MAILED TO HIM BUT WANTED TO DISCUSS W2 AND SS FURTHER - PLEASE ADVISE

## 2023-11-03 NOTE — TELEPHONE ENCOUNTER
Left a message for Mr. Rojas that it is time to fill out the 2024 application for [employer] assistance to get your Entresto.  Please call back and speak with [place].  Let me know if they already sent you an application or if I need to mail one to you.  Once signed return with your most recent W2 forms or SS statement.

## 2023-11-07 DIAGNOSIS — E11.9 TYPE 2 DIABETES MELLITUS WITHOUT COMPLICATION, WITHOUT LONG-TERM CURRENT USE OF INSULIN: ICD-10-CM

## 2023-11-20 DIAGNOSIS — E11.9 TYPE 2 DIABETES MELLITUS WITHOUT COMPLICATION, WITHOUT LONG-TERM CURRENT USE OF INSULIN: ICD-10-CM

## 2023-11-20 DIAGNOSIS — I25.5 ISCHEMIC CARDIOMYOPATHY: ICD-10-CM

## 2023-11-20 RX ORDER — SACUBITRIL AND VALSARTAN 97; 103 MG/1; MG/1
1 TABLET, FILM COATED ORAL 2 TIMES DAILY
Qty: 180 TABLET | Refills: 3 | Status: SHIPPED | OUTPATIENT
Start: 2023-11-20

## 2023-11-27 NOTE — TELEPHONE ENCOUNTER
Caller: Sacha Rojas    Relationship: Self    Best call back number: [phone]    Requested Prescriptions:   Requested Prescriptions     Pending Prescriptions Disp Refills    metFORMIN (GLUCOPHAGE) 500 MG tablet [Pharmacy Med Name: metFORMIN  MG TABLET] 30 tablet 0     Sig: TAKE 1 TABLET BY MOUTH TWICE A DAY WITH MEALS **MUST CALL MD FOR APPOINTMENT        Pharmacy where request should be sent: [facility] [id number] [facility] [address] - [phone] [facility] [phone] FX     Last office visit with prescribing clinician: 11/1/2022   Last telemedicine visit with prescribing clinician: Visit date not found   Next office visit with prescribing clinician: 2/13/2024     Additional details provided by patient: REQUESTING A REFILLS UNTIL APPOINTMENT    Does the patient have less than a 3 day supply:  [x] Yes  [] No    Would you like a call back once the refill request has been completed: [] Yes [x] No    If the office needs to give you a call back, can they leave a voicemail: [] Yes [x] No    Iveth Cardenas Rep   11/27/23 15:06 EST

## 2023-11-28 ENCOUNTER — TELEPHONE (OUTPATIENT)
Dept: CARDIOLOGY | Facility: CLINIC | Age: 68
End: 2023-11-28
Payer: MEDICARE

## 2023-11-28 NOTE — TELEPHONE ENCOUNTER
Left a message for  and advised him that his Entresto assistance has been approved through 12/31/2024.

## 2023-12-05 ENCOUNTER — TELEPHONE (OUTPATIENT)
Dept: CARDIOLOGY | Facility: CLINIC | Age: 68
End: 2023-12-05
Payer: MEDICARE

## 2023-12-05 NOTE — TELEPHONE ENCOUNTER
Left a message for Mr. Rojas that I will send the Farxiga application to fill out, however they have made some changes for 2024 and I am not sure he will qualify.  We can send and see what happens.  I will mail you the application.

## 2024-01-04 ENCOUNTER — OFFICE VISIT (OUTPATIENT)
Dept: CARDIOLOGY | Facility: CLINIC | Age: 69
End: 2024-01-04
Payer: MEDICARE

## 2024-01-04 VITALS
BODY MASS INDEX: 50.62 KG/M2 | OXYGEN SATURATION: 94 % | SYSTOLIC BLOOD PRESSURE: 134 MMHG | HEIGHT: 66 IN | TEMPERATURE: 97 F | DIASTOLIC BLOOD PRESSURE: 84 MMHG | RESPIRATION RATE: 12 BRPM | HEART RATE: 86 BPM | WEIGHT: 315 LBS

## 2024-01-04 DIAGNOSIS — E78.5 HYPERLIPIDEMIA LDL GOAL <70: ICD-10-CM

## 2024-01-04 DIAGNOSIS — I50.22 CHRONIC HFREF (HEART FAILURE WITH REDUCED EJECTION FRACTION): Primary | ICD-10-CM

## 2024-01-04 DIAGNOSIS — R06.02 SHORTNESS OF BREATH: ICD-10-CM

## 2024-01-04 DIAGNOSIS — I25.5 ISCHEMIC CARDIOMYOPATHY: ICD-10-CM

## 2024-01-04 DIAGNOSIS — I10 ESSENTIAL HYPERTENSION: ICD-10-CM

## 2024-01-04 DIAGNOSIS — E11.9 TYPE 2 DIABETES MELLITUS WITHOUT COMPLICATION, WITHOUT LONG-TERM CURRENT USE OF INSULIN: ICD-10-CM

## 2024-01-04 DIAGNOSIS — Z72.0 TOBACCO USE: ICD-10-CM

## 2024-01-04 NOTE — PROGRESS NOTES
[facility] [place]  [facility] CARDIOLOGY  [address] [place] [address]  Dept: [phone]  Dept Fax: [phone]    Sacha Rojas  1955    Follow Up Office Visit Note    History of Present Illness:  Sacha Rojas is a 68 y.o. male who presents to the clinic for Follow-up.Ischemic cardiomyopathy- He denies any major complaints , has lost 12 pounds,        Last Ef was 32%, on Entresto 97.103 bid,Farxiga 10 mg, Torsemide 10 mg,Metoprolol 50 mg bid and Aldactone 25 mg, no edema, better after Torsemide, , will get lab today  The following portions of the patient's history were reviewed and updated as appropriate: allergies, current medications, past family history, past medical history, past social history, past surgical history, and problem list.    Medications:  ASPIRIN 81 PO  cefuroxime  Entresto tablet  ezetimibe  Farxiga tablet  metFORMIN  metoprolol tartrate  rosuvastatin  spironolactone  torsemide  triamcinolone acetonide  vitamin B-12  vitamin D3 tablet    Subjective  Allergies   Allergen Reactions    Clopidogrel Bisulfate Hives    Clindamycin/Lincomycin Rash    Varenicline Tartrate Hives        Past Medical History:   Diagnosis Date    Benign essential hypertension     Body mass index (BMI)40.0-44.9, adult     CAD (coronary artery disease) 2001    100% RCA 95% prox LAD    COPD (chronic obstructive pulmonary disease)     Coronary arteriosclerosis in native artery     Hypertension     Impaired fasting blood sugar     Ischemic cardiomyopathy     He seems doing fine, no chest pain, no SOB, no edema, he underwent cardiac cath with Moderate disase 60 to 70 % CX, LAD 60 to 70 % and %; will keep medical treatment Entresto 49.51 bid, Metoprolol 50 mg bid will add Farixga 10 mg daily.    Mixed hyperlipidemia     Obesity     Tobacco dependence syndrome     Tonsillitis     Type 2 diabetes mellitus without complication, without long-term current use of insulin      "Vitamin D deficiency        Past Surgical History:   Procedure Laterality Date    ADENOIDECTOMY      COLONOSCOPY      COLONOSCOPY N/A 2/7/2023    Procedure: COLONOSCOPY;  Surgeon: Estevna Dunn MD;  Location: [facility];  Service: Gastroenterology;  Laterality: N/A;    OTHER SURGICAL HISTORY      STENT PLACEMENT  2001 and 2005    TONSILLECTOMY         Family History   Problem Relation Age of Onset    Stomach cancer Mother     Coronary artery disease Father     Peripheral vascular disease Father     Hypertension Father         Social History     Socioeconomic History    Marital status:    Tobacco Use    Smoking status: Heavy Smoker     Packs/day: 1.00     Years: 50.00     Additional pack years: 0.00     Total pack years: 50.00     Types: Cigarettes    Smokeless tobacco: Former     Types: Chew    Tobacco comments:     Up to 4 packs per day years ago.    Vaping Use    Vaping Use: Never used   Substance and Sexual Activity    Alcohol use: Not Currently    Drug use: Never    Sexual activity: Defer       Review of Systems   Constitutional: Negative.    HENT: Negative.     Respiratory:  Positive for shortness of breath.    Cardiovascular: Negative.    Endocrine: Negative.    Genitourinary: Negative.    Musculoskeletal: Negative.    Skin: Negative.    Allergic/Immunologic: Negative.    Neurological: Negative.    Hematological: Negative.    Psychiatric/Behavioral: Negative.         Cardiovascular Procedures    ECHO/MUGA:  STRESS TESTS:   CARDIAC CATH:   DEVICES:   HOLTER:   CT/MRI:   VASCULAR:   CARDIOTHORACIC:     Objective  Vitals:    01/04/24 0847   BP: 134/84   BP Location: Right arm   Patient Position: Lying   Cuff Size: Adult   Pulse: 86   Resp: 12   Temp: 97 °F (36.1 °C)   TempSrc: Infrared   SpO2: 94%   Weight: (!) 144 kg (318 lb)   Height: 167.6 cm (66\")   PainSc: 0-No pain     Body mass index is 51.33 kg/m².     Physical Exam  Vitals reviewed.   Constitutional:       Appearance: Healthy " appearance. Not in distress.   Neck:      Vascular: No JVR. JVD normal.   Pulmonary:      Effort: Pulmonary effort is normal.      Breath sounds: Normal breath sounds. No wheezing. No rhonchi. No rales.   Chest:      Chest wall: Not tender to palpatation.   Cardiovascular:      PMI at left midclavicular line. Normal rate. Regular rhythm. Normal S1. Normal S2.       Murmurs: There is no murmur.      No gallop.  No click. No rub.   Pulses:     Intact distal pulses.   Edema:     Peripheral edema absent.   Abdominal:      General: Bowel sounds are normal.      Palpations: Abdomen is soft.      Tenderness: There is no abdominal tenderness.   Musculoskeletal: Normal range of motion.         General: No tenderness. Skin:     General: Skin is warm and dry.   Neurological:      General: No focal deficit present.      Mental Status: Alert and oriented to person, place and time.          Diagnostic Data  Procedures    Assessment and Plan  Diagnoses and all orders for this visit:    Chronic HFrEF (heart failure with reduced ejection fraction)- Doing better, has some SOB on exertion, no edema gone after Torsemide 10 mg, on Entresto 97.103 bid Metoprolol 50 mg bid Aldactone 25mg and Farxiga 10 mg    Essential hypertension- BP is 100.60, will keep failure meds,    Ischemic cardiomyopathy- stable some SOB on big exertion, last Ef was 32% , he is on great medical treatment Entresto 97.103 bid, Metoprolol 50 mg bid Farxiga, Aldactone 25mg and Torsemide 10 mg, will keep same approach  -     CBC & Differential  -     Comprehensive Metabolic Panel  -     proBNP    Hyperlipidemia LDL goal <70- on Crestor 40 mg and Zetia, will get a Lipid, tolerates well   -     CK  -     Lipid Panel    Tobacco use- Still smoking    Type 2 diabetes mellitus without complication, without long-term current use of insulin  -     Hemoglobin A1c    Shortness of breath  -     proBNP         Return in about 6 months (around 7/4/2024) for Recheck with   Genaro.    Darrion Lam MD  01/04/2024

## 2024-01-05 ENCOUNTER — TELEPHONE (OUTPATIENT)
Dept: CARDIOLOGY | Facility: CLINIC | Age: 69
End: 2024-01-05
Payer: MEDICARE

## 2024-01-05 LAB
ALBUMIN SERPL-MCNC: 4.3 G/DL (ref 3.9–4.9)
ALBUMIN/GLOB SERPL: 1.8 {RATIO} (ref 1.2–2.2)
ALP SERPL-CCNC: 99 IU/L (ref 44–121)
ALT SERPL-CCNC: 28 IU/L (ref 0–44)
AST SERPL-CCNC: 23 IU/L (ref 0–40)
BASOPHILS # BLD AUTO: 0.1 X10E3/UL (ref 0–0.2)
BASOPHILS NFR BLD AUTO: 1 %
BILIRUB SERPL-MCNC: 0.4 MG/DL (ref 0–1.2)
BUN SERPL-MCNC: 17 MG/DL (ref 8–27)
BUN/CREAT SERPL: 15 (ref 10–24)
CALCIUM SERPL-MCNC: 9.6 MG/DL (ref 8.6–10.2)
CHLORIDE SERPL-SCNC: 100 MMOL/L (ref 96–106)
CHOLEST SERPL-MCNC: 120 MG/DL (ref 100–199)
CK SERPL-CCNC: 39 U/L (ref 41–331)
CO2 SERPL-SCNC: 20 MMOL/L (ref 20–29)
CREAT SERPL-MCNC: 1.1 MG/DL (ref 0.76–1.27)
EGFRCR SERPLBLD CKD-EPI 2021: 74 ML/MIN/1.73
EOSINOPHIL # BLD AUTO: 0.1 X10E3/UL (ref 0–0.4)
EOSINOPHIL NFR BLD AUTO: 1 %
ERYTHROCYTE [DISTWIDTH] IN BLOOD BY AUTOMATED COUNT: 16 % (ref 11.6–15.4)
GLOBULIN SER CALC-MCNC: 2.4 G/DL (ref 1.5–4.5)
GLUCOSE SERPL-MCNC: 136 MG/DL (ref 70–99)
HBA1C MFR BLD: 7.5 % (ref 4.8–5.6)
HCT VFR BLD AUTO: 45.5 % (ref 37.5–51)
HDLC SERPL-MCNC: 31 MG/DL
HGB BLD-MCNC: 15.3 G/DL (ref 13–17.7)
IMM GRANULOCYTES # BLD AUTO: 0.1 X10E3/UL (ref 0–0.1)
IMM GRANULOCYTES NFR BLD AUTO: 1 %
LDLC SERPL CALC-MCNC: 64 MG/DL (ref 0–99)
LYMPHOCYTES # BLD AUTO: 2.1 X10E3/UL (ref 0.7–3.1)
LYMPHOCYTES NFR BLD AUTO: 24 %
MCH RBC QN AUTO: 29 PG (ref 26.6–33)
MCHC RBC AUTO-ENTMCNC: 33.6 G/DL (ref 31.5–35.7)
MCV RBC AUTO: 86 FL (ref 79–97)
MONOCYTES # BLD AUTO: 0.6 X10E3/UL (ref 0.1–0.9)
MONOCYTES NFR BLD AUTO: 6 %
NEUTROPHILS # BLD AUTO: 6 X10E3/UL (ref 1.4–7)
NEUTROPHILS NFR BLD AUTO: 67 %
NT-PROBNP SERPL-MCNC: 49 PG/ML (ref 0–376)
PLATELET # BLD AUTO: 219 X10E3/UL (ref 150–450)
POTASSIUM SERPL-SCNC: 4.9 MMOL/L (ref 3.5–5.2)
PROT SERPL-MCNC: 6.7 G/DL (ref 6–8.5)
RBC # BLD AUTO: 5.27 X10E6/UL (ref 4.14–5.8)
SODIUM SERPL-SCNC: 140 MMOL/L (ref 134–144)
TRIGL SERPL-MCNC: 143 MG/DL (ref 0–149)
VLDLC SERPL CALC-MCNC: 25 MG/DL (ref 5–40)
WBC # BLD AUTO: 8.9 X10E3/UL (ref 3.4–10.8)

## 2024-01-05 NOTE — TELEPHONE ENCOUNTER
Message  Received: Today  Darrion Lam MD Burris, Theresa, MA  Lab looks good, except A!C 7,5, keep losing weight. Left message for pt and also hub can tell pt .

## 2024-01-05 NOTE — TELEPHONE ENCOUNTER
----- Message from Darrion Lam MD sent at 1/5/2024  4:09 PM EST -----  Lab looks good, except A!C 7,5, keep losing weight

## 2024-02-13 ENCOUNTER — OFFICE VISIT (OUTPATIENT)
Dept: FAMILY MEDICINE CLINIC | Facility: CLINIC | Age: 69
End: 2024-02-13
Payer: MEDICARE

## 2024-02-13 VITALS
HEART RATE: 73 BPM | WEIGHT: 315 LBS | SYSTOLIC BLOOD PRESSURE: 124 MMHG | DIASTOLIC BLOOD PRESSURE: 62 MMHG | OXYGEN SATURATION: 97 % | BODY MASS INDEX: 51.33 KG/M2

## 2024-02-13 DIAGNOSIS — I50.22 CHRONIC HFREF (HEART FAILURE WITH REDUCED EJECTION FRACTION): ICD-10-CM

## 2024-02-13 DIAGNOSIS — Z00.00 GENERAL MEDICAL EXAM: Primary | ICD-10-CM

## 2024-02-13 DIAGNOSIS — E66.01 CLASS 3 SEVERE OBESITY DUE TO EXCESS CALORIES WITH SERIOUS COMORBIDITY AND BODY MASS INDEX (BMI) OF 50.0 TO 59.9 IN ADULT: ICD-10-CM

## 2024-02-13 DIAGNOSIS — I25.5 ISCHEMIC CARDIOMYOPATHY: ICD-10-CM

## 2024-02-13 DIAGNOSIS — Z12.5 PROSTATE CANCER SCREENING: ICD-10-CM

## 2024-02-13 DIAGNOSIS — Z23 NEEDS FLU SHOT: ICD-10-CM

## 2024-02-13 DIAGNOSIS — Z87.891 PERSONAL HISTORY OF TOBACCO USE: ICD-10-CM

## 2024-02-13 DIAGNOSIS — R80.9 TYPE 2 DIABETES MELLITUS WITH MICROALBUMINURIA, WITHOUT LONG-TERM CURRENT USE OF INSULIN: ICD-10-CM

## 2024-02-13 DIAGNOSIS — E53.8 B12 DEFICIENCY: ICD-10-CM

## 2024-02-13 DIAGNOSIS — Z11.59 NEED FOR HEPATITIS C SCREENING TEST: ICD-10-CM

## 2024-02-13 DIAGNOSIS — E78.5 HYPERLIPIDEMIA LDL GOAL <70: ICD-10-CM

## 2024-02-13 DIAGNOSIS — I10 ESSENTIAL HYPERTENSION: ICD-10-CM

## 2024-02-13 DIAGNOSIS — Z12.2 ENCOUNTER FOR SCREENING FOR LUNG CANCER: ICD-10-CM

## 2024-02-13 DIAGNOSIS — Z23 NEED FOR PROPHYLACTIC VACCINATION AGAINST STREPTOCOCCUS PNEUMONIAE (PNEUMOCOCCUS): ICD-10-CM

## 2024-02-13 DIAGNOSIS — E11.29 TYPE 2 DIABETES MELLITUS WITH MICROALBUMINURIA, WITHOUT LONG-TERM CURRENT USE OF INSULIN: ICD-10-CM

## 2024-02-13 PROBLEM — N39.0 ACUTE UTI: Status: RESOLVED | Noted: 2023-07-24 | Resolved: 2024-02-13

## 2024-02-13 PROBLEM — R19.7 DIARRHEA OF PRESUMED INFECTIOUS ORIGIN: Status: RESOLVED | Noted: 2022-04-30 | Resolved: 2024-02-13

## 2024-02-13 PROBLEM — T36.95XA ALLERGIC REACTION DUE TO ANTIBACTERIAL DRUG: Status: RESOLVED | Noted: 2022-12-03 | Resolved: 2024-02-13

## 2024-02-13 PROBLEM — E66.813 CLASS 3 SEVERE OBESITY DUE TO EXCESS CALORIES WITH SERIOUS COMORBIDITY AND BODY MASS INDEX (BMI) OF 50.0 TO 59.9 IN ADULT: Status: ACTIVE | Noted: 2024-02-13

## 2024-02-13 PROBLEM — E11.9 TYPE 2 DIABETES MELLITUS, WITHOUT LONG-TERM CURRENT USE OF INSULIN: Chronic | Status: ACTIVE | Noted: 2021-03-01

## 2024-02-13 LAB
EXPIRATION DATE: NORMAL
Lab: NORMAL
POC CREATININE URINE: 50
POC MICROALBUMIN URINE: 80

## 2024-02-13 PROCEDURE — 90677 PCV20 VACCINE IM: CPT | Performed by: FAMILY MEDICINE

## 2024-02-13 PROCEDURE — 1160F RVW MEDS BY RX/DR IN RCRD: CPT | Performed by: FAMILY MEDICINE

## 2024-02-13 PROCEDURE — 3078F DIAST BP <80 MM HG: CPT | Performed by: FAMILY MEDICINE

## 2024-02-13 PROCEDURE — G0009 ADMIN PNEUMOCOCCAL VACCINE: HCPCS | Performed by: FAMILY MEDICINE

## 2024-02-13 PROCEDURE — 3074F SYST BP LT 130 MM HG: CPT | Performed by: FAMILY MEDICINE

## 2024-02-13 PROCEDURE — 1170F FXNL STATUS ASSESSED: CPT | Performed by: FAMILY MEDICINE

## 2024-02-13 PROCEDURE — G0008 ADMIN INFLUENZA VIRUS VAC: HCPCS | Performed by: FAMILY MEDICINE

## 2024-02-13 PROCEDURE — 82044 UR ALBUMIN SEMIQUANTITATIVE: CPT | Performed by: FAMILY MEDICINE

## 2024-02-13 PROCEDURE — G0439 PPPS, SUBSEQ VISIT: HCPCS | Performed by: FAMILY MEDICINE

## 2024-02-13 PROCEDURE — 99214 OFFICE O/P EST MOD 30 MIN: CPT | Performed by: FAMILY MEDICINE

## 2024-02-13 PROCEDURE — 1159F MED LIST DOCD IN RCRD: CPT | Performed by: FAMILY MEDICINE

## 2024-02-13 PROCEDURE — 90662 IIV NO PRSV INCREASED AG IM: CPT | Performed by: FAMILY MEDICINE

## 2024-02-13 PROCEDURE — 3051F HG A1C>EQUAL 7.0%<8.0%: CPT | Performed by: FAMILY MEDICINE

## 2024-02-13 RX ORDER — ASPIRIN 81 MG/1
81 TABLET ORAL DAILY
Start: 2024-02-13

## 2024-02-13 RX ORDER — METFORMIN HYDROCHLORIDE 500 MG/1
1000 TABLET, EXTENDED RELEASE ORAL 2 TIMES DAILY WITH MEALS
Qty: 360 TABLET | Refills: 1 | Status: SHIPPED | OUTPATIENT
Start: 2024-02-13

## 2024-02-13 RX ORDER — LANOLIN ALCOHOL/MO/W.PET/CERES
1000 CREAM (GRAM) TOPICAL DAILY
Start: 2024-02-13

## 2024-02-14 ENCOUNTER — TELEPHONE (OUTPATIENT)
Dept: FAMILY MEDICINE CLINIC | Facility: CLINIC | Age: 69
End: 2024-02-14
Payer: MEDICARE

## 2024-02-21 ENCOUNTER — TELEPHONE (OUTPATIENT)
Dept: FAMILY MEDICINE CLINIC | Facility: CLINIC | Age: 69
End: 2024-02-21
Payer: MEDICARE

## 2024-02-21 NOTE — TELEPHONE ENCOUNTER
Left Message: THE MESSAGE BELOW IS ABLE TO BE GIVEN BY THE HUB.  THE HUB MAY SCHEDULE A FOLLOW-UP VISIT FOR THE PATIENT IF INDICATED IN THE MESSAGE BELOW...     Please call patient with recent low-dose lung CT results for lung cancer screening:     His recent low-dose lung CT did return with ongoing severe coronary artery calcifications and he does need to continue to follow-up with his cardiologist and continue on his low-dose aspirin along with his Crestor and Zetia to help with heart protection.     Radiology did comment on some areas of scarring and evidence for prior infection (calcified granuloma) but no suspicious pulmonary nodules.     Overall they did not see anything concerning for lung cancer and recommended that he repeat his low-dose lung CT again in 1 year for lung cancer screening.

## 2024-02-21 NOTE — TELEPHONE ENCOUNTER
Name: SandownSacha martini J      Relationship: Self      Best Callback Number: [phone]       HUB PROVIDED THE RELAY MESSAGE FROM THE OFFICE      PATIENT: VOICED UNDERSTANDING AND HAS NO FURTHER QUESTIONS AT THIS TIME    ADDITIONAL INFORMATION: HE SAID THANKS FOR LETTING HIM KNOW

## 2024-02-22 ENCOUNTER — TELEPHONE (OUTPATIENT)
Dept: FAMILY MEDICINE CLINIC | Facility: CLINIC | Age: 69
End: 2024-02-22
Payer: MEDICARE

## 2024-02-22 NOTE — TELEPHONE ENCOUNTER
He fell and they want to do surgery on his knee    Do  You think his heart can stand  that ???    Please call him back   
He was asymptomatic last time, if he needs to go for surgery , he needs to go, in genral that surgery is low risk, My feeling is you will do fine , where is he?  
Spoke with Mr. Rojas and he states about 3-4 days ago his Left knee gave out and he fell.  I went to the ER and they say it is bone on bone and will probably need something done to it. I am home and trying to take it easy for a few days.   I advised that per Dr. Lam he is good for you to have your surgery for your knee, usually a low risk surgery and you are without complaints at this time.  He states other than his knee he is feeling good.   Pt verbalized understanding.   
4 = No assist / stand by assistance

## 2024-02-23 NOTE — TELEPHONE ENCOUNTER
Pt Contacted: Message below given, Pt verbalized understanding.     Please call patient with recent low-dose lung CT results for lung cancer screening:     His recent low-dose lung CT did return with ongoing severe coronary artery calcifications and he does need to continue to follow-up with his cardiologist and continue on his low-dose aspirin along with his Crestor and Zetia to help with heart protection.     Radiology did comment on some areas of scarring and evidence for prior infection (calcified granuloma) but no suspicious pulmonary nodules.     Overall they did not see anything concerning for lung cancer and recommended that he repeat his low-dose lung CT again in 1 year for lung cancer screening.

## 2024-02-24 DIAGNOSIS — E11.9 TYPE 2 DIABETES MELLITUS WITHOUT COMPLICATION, WITHOUT LONG-TERM CURRENT USE OF INSULIN: ICD-10-CM

## 2024-04-17 ENCOUNTER — TELEPHONE (OUTPATIENT)
Dept: FAMILY MEDICINE CLINIC | Facility: CLINIC | Age: 69
End: 2024-04-17
Payer: MEDICARE

## 2024-04-17 NOTE — TELEPHONE ENCOUNTER
Patient has been contacted to reschedule appt with Dr. Solano, appt has been rescheduled to 6/27/24

## 2024-04-24 DIAGNOSIS — E78.5 HYPERLIPIDEMIA LDL GOAL <70: ICD-10-CM

## 2024-04-24 RX ORDER — ROSUVASTATIN CALCIUM 40 MG/1
40 TABLET, COATED ORAL DAILY
Qty: 90 TABLET | Refills: 3 | Status: SHIPPED | OUTPATIENT
Start: 2024-04-24

## 2024-06-01 DIAGNOSIS — I25.10 CORONARY ARTERY DISEASE INVOLVING NATIVE CORONARY ARTERY OF NATIVE HEART WITHOUT ANGINA PECTORIS: ICD-10-CM

## 2024-06-03 RX ORDER — METOPROLOL TARTRATE 50 MG/1
50 TABLET, FILM COATED ORAL 2 TIMES DAILY
Qty: 180 TABLET | Refills: 1 | Status: SHIPPED | OUTPATIENT
Start: 2024-06-03

## 2024-06-06 ENCOUNTER — LAB (OUTPATIENT)
Dept: FAMILY MEDICINE CLINIC | Facility: CLINIC | Age: 69
End: 2024-06-06
Payer: MEDICARE

## 2024-06-06 DIAGNOSIS — E53.8 B12 DEFICIENCY: ICD-10-CM

## 2024-06-06 DIAGNOSIS — Z11.59 NEED FOR HEPATITIS C SCREENING TEST: ICD-10-CM

## 2024-06-06 DIAGNOSIS — I10 ESSENTIAL HYPERTENSION: ICD-10-CM

## 2024-06-06 DIAGNOSIS — E78.5 HYPERLIPIDEMIA LDL GOAL <70: ICD-10-CM

## 2024-06-06 DIAGNOSIS — E11.29 TYPE 2 DIABETES MELLITUS WITH MICROALBUMINURIA, WITHOUT LONG-TERM CURRENT USE OF INSULIN: ICD-10-CM

## 2024-06-06 DIAGNOSIS — R80.9 TYPE 2 DIABETES MELLITUS WITH MICROALBUMINURIA, WITHOUT LONG-TERM CURRENT USE OF INSULIN: ICD-10-CM

## 2024-06-06 DIAGNOSIS — Z12.5 PROSTATE CANCER SCREENING: ICD-10-CM

## 2024-06-06 DIAGNOSIS — I25.5 ISCHEMIC CARDIOMYOPATHY: ICD-10-CM

## 2024-06-06 PROCEDURE — 36415 COLL VENOUS BLD VENIPUNCTURE: CPT | Performed by: FAMILY MEDICINE

## 2024-06-07 ENCOUNTER — TELEPHONE (OUTPATIENT)
Dept: FAMILY MEDICINE CLINIC | Facility: CLINIC | Age: 69
End: 2024-06-07
Payer: MEDICARE

## 2024-06-07 LAB
ALBUMIN SERPL-MCNC: 4.3 G/DL (ref 3.9–4.9)
ALBUMIN/GLOB SERPL: 2 {RATIO} (ref 1.2–2.2)
ALP SERPL-CCNC: 95 IU/L (ref 44–121)
ALT SERPL-CCNC: 20 IU/L (ref 0–44)
AST SERPL-CCNC: 17 IU/L (ref 0–40)
BILIRUB SERPL-MCNC: 0.4 MG/DL (ref 0–1.2)
BUN SERPL-MCNC: 21 MG/DL (ref 8–27)
BUN/CREAT SERPL: 21 (ref 10–24)
CALCIUM SERPL-MCNC: 9.8 MG/DL (ref 8.6–10.2)
CHLORIDE SERPL-SCNC: 101 MMOL/L (ref 96–106)
CHOLEST SERPL-MCNC: 111 MG/DL (ref 100–199)
CO2 SERPL-SCNC: 21 MMOL/L (ref 20–29)
CREAT SERPL-MCNC: 1.02 MG/DL (ref 0.76–1.27)
EGFRCR SERPLBLD CKD-EPI 2021: 81 ML/MIN/1.73
GLOBULIN SER CALC-MCNC: 2.2 G/DL (ref 1.5–4.5)
GLUCOSE SERPL-MCNC: 109 MG/DL (ref 70–99)
HBA1C MFR BLD: 6.9 % (ref 4.8–5.6)
HCV AB SERPL QL IA: NORMAL
HCV IGG SERPL QL IA: NON REACTIVE
HDLC SERPL-MCNC: 31 MG/DL
LDLC SERPL CALC-MCNC: 53 MG/DL (ref 0–99)
POTASSIUM SERPL-SCNC: 4.4 MMOL/L (ref 3.5–5.2)
PROT SERPL-MCNC: 6.5 G/DL (ref 6–8.5)
PSA SERPL-MCNC: 0.4 NG/ML (ref 0–4)
SODIUM SERPL-SCNC: 138 MMOL/L (ref 134–144)
T4 FREE SERPL-MCNC: 1.09 NG/DL (ref 0.82–1.77)
TRIGL SERPL-MCNC: 157 MG/DL (ref 0–149)
TSH SERPL DL<=0.005 MIU/L-ACNC: 3.77 UIU/ML (ref 0.45–4.5)
VIT B12 SERPL-MCNC: 959 PG/ML (ref 232–1245)
VLDLC SERPL CALC-MCNC: 27 MG/DL (ref 5–40)

## 2024-06-07 NOTE — PROGRESS NOTES
THE MESSAGE BELOW IS ABLE TO BE GIVEN BY THE HUB.  THE HUB MAY SCHEDULE A FOLLOW-UP VISIT FOR THE PATIENT IF INDICATED IN THE MESSAGE BELOW...    Please call patient with recent lab work results:    His comprehensive metabolic panel returned with good kidney function, normal liver enzymes, good electrolytes, and improvement in his blood sugar compared to past labs down to 109.    His lipid panel returned with good cholesterol control and very mild triglyceride elevation.  No medication changes are needed.    His A1c did return improved at 6.9 compared to 7.5 when last checked in January.  This is great given we would like to keep the A1c under 7 help prevent long-term complications of diabetes.    His hepatitis C screening blood work returned negative.    We are still waiting on his thyroid, B12, and PSA levels to return and we will contact him with those results when they become available.    Please have him keep his appointment as scheduled for June 27, 2024

## 2024-06-07 NOTE — TELEPHONE ENCOUNTER
Caller: Sacha Rojas    Relationship: Self    Best call back number: [phone]    What form or medical record are you requesting: COPY OF TEST RESULTS FROM 6/6    Who is requesting this form or medical record from you:     How would you like to receive the form or medical records (pick-up, mail, fax): PT WILL LIKE TO HAVE IT MAILED TO   [address]  [facility] [id number]     Timeframe paperwork needed: ASAP

## 2024-06-11 RX ORDER — SPIRONOLACTONE 25 MG/1
25 TABLET ORAL DAILY
Qty: 90 TABLET | Refills: 1 | Status: SHIPPED | OUTPATIENT
Start: 2024-06-11

## 2024-06-27 ENCOUNTER — TELEPHONE (OUTPATIENT)
Dept: FAMILY MEDICINE CLINIC | Facility: CLINIC | Age: 69
End: 2024-06-27

## 2024-06-27 ENCOUNTER — OFFICE VISIT (OUTPATIENT)
Dept: FAMILY MEDICINE CLINIC | Facility: CLINIC | Age: 69
End: 2024-06-27
Payer: MEDICARE

## 2024-06-27 VITALS
HEIGHT: 66 IN | OXYGEN SATURATION: 96 % | HEART RATE: 72 BPM | WEIGHT: 312.1 LBS | BODY MASS INDEX: 50.16 KG/M2 | SYSTOLIC BLOOD PRESSURE: 100 MMHG | DIASTOLIC BLOOD PRESSURE: 60 MMHG

## 2024-06-27 DIAGNOSIS — E78.5 HYPERLIPIDEMIA LDL GOAL <70: Chronic | ICD-10-CM

## 2024-06-27 DIAGNOSIS — I50.22 CHRONIC HFREF (HEART FAILURE WITH REDUCED EJECTION FRACTION): Chronic | ICD-10-CM

## 2024-06-27 DIAGNOSIS — I25.5 ISCHEMIC CARDIOMYOPATHY: Chronic | ICD-10-CM

## 2024-06-27 DIAGNOSIS — E66.01 CLASS 3 SEVERE OBESITY DUE TO EXCESS CALORIES WITH SERIOUS COMORBIDITY AND BODY MASS INDEX (BMI) OF 50.0 TO 59.9 IN ADULT: ICD-10-CM

## 2024-06-27 DIAGNOSIS — E53.8 B12 DEFICIENCY: Chronic | ICD-10-CM

## 2024-06-27 DIAGNOSIS — I10 ESSENTIAL HYPERTENSION: Chronic | ICD-10-CM

## 2024-06-27 DIAGNOSIS — R80.9 TYPE 2 DIABETES MELLITUS WITH DIABETIC MICROALBUMINURIA, WITHOUT LONG-TERM CURRENT USE OF INSULIN: Primary | ICD-10-CM

## 2024-06-27 DIAGNOSIS — Z12.5 PROSTATE CANCER SCREENING: ICD-10-CM

## 2024-06-27 DIAGNOSIS — E11.29 TYPE 2 DIABETES MELLITUS WITH DIABETIC MICROALBUMINURIA, WITHOUT LONG-TERM CURRENT USE OF INSULIN: Primary | ICD-10-CM

## 2024-06-27 PROBLEM — Z11.59 NEED FOR HEPATITIS C SCREENING TEST: Status: RESOLVED | Noted: 2024-02-13 | Resolved: 2024-06-27

## 2024-06-27 PROCEDURE — G2211 COMPLEX E/M VISIT ADD ON: HCPCS | Performed by: FAMILY MEDICINE

## 2024-06-27 PROCEDURE — 3044F HG A1C LEVEL LT 7.0%: CPT | Performed by: FAMILY MEDICINE

## 2024-06-27 PROCEDURE — 3074F SYST BP LT 130 MM HG: CPT | Performed by: FAMILY MEDICINE

## 2024-06-27 PROCEDURE — 1159F MED LIST DOCD IN RCRD: CPT | Performed by: FAMILY MEDICINE

## 2024-06-27 PROCEDURE — 1160F RVW MEDS BY RX/DR IN RCRD: CPT | Performed by: FAMILY MEDICINE

## 2024-06-27 PROCEDURE — 99214 OFFICE O/P EST MOD 30 MIN: CPT | Performed by: FAMILY MEDICINE

## 2024-06-27 PROCEDURE — 3078F DIAST BP <80 MM HG: CPT | Performed by: FAMILY MEDICINE

## 2024-06-27 RX ORDER — METFORMIN HYDROCHLORIDE 500 MG/1
1000 TABLET, EXTENDED RELEASE ORAL 2 TIMES DAILY WITH MEALS
Qty: 360 TABLET | Refills: 1 | Status: SHIPPED | OUTPATIENT
Start: 2024-06-27

## 2024-06-27 RX ORDER — LANOLIN ALCOHOL/MO/W.PET/CERES
1000 CREAM (GRAM) TOPICAL DAILY
Start: 2024-06-27

## 2024-06-27 RX ORDER — ASPIRIN 81 MG/1
81 TABLET ORAL DAILY
Start: 2024-06-27

## 2024-06-27 NOTE — PROGRESS NOTES
"Chief Complaint  Diabetes, hypertension, cardiomyopathy, B12 deficiency    Subjective    History of Present Illness:  Sacha Rojas is a 69 y.o. male who presents today for follow-up regarding diabetes, hypertension, coronary artery disease, and recent fasting labs.     Doing well with ongoing cardiology follow-up related to his coronary artery disease, chronic congestive heart failure, hyperlipidemia, and hypertension.     He did have fasting blood work done earlier this month and is here to review the results:    Comprehensive metabolic panel returned normal with fasting glucose at 109, lipid panel returned with good cholesterol control with LDL at 53, thyroid studies returned normal, A1c improved at 6.9 compared to 7.5 when last checked in December, B12 returned normal, hepatitis C screening returned negative, screening PSA normal at 0.4     Low-dose lung CT up-to-date February 19, 2024 with no suspicious pulmonary nodules and recommended annual screening.  Plans for repeat low-dose lung CT February 2025    Objective   Vital Signs:   /60 (BP Location: Left arm, Patient Position: Sitting, Cuff Size: Large Adult)   Pulse 72   Ht 167.6 cm (66\")   Wt (!) 142 kg (312 lb 1.6 oz)   SpO2 96%   BMI 50.37 kg/m²     Review of Systems   Constitutional:  Negative for appetite change, chills, fever and unexpected weight loss.   HENT:  Negative for hearing loss.    Eyes:  Negative for blurred vision.   Respiratory:  Negative for chest tightness.    Cardiovascular:  Negative for chest pain.   Gastrointestinal:  Negative for abdominal pain.   Endocrine: Negative for polydipsia and polyuria.   Musculoskeletal:  Negative for gait problem.   Skin:  Negative for rash.   Neurological:  Negative for tremors, speech difficulty and confusion.   Psychiatric/Behavioral:  Negative for depressed mood.        Past History:  Medical History: has a past medical history of Arthritis, Benign essential hypertension, Body mass " index (BMI)40.0-44.9, adult, CAD (coronary artery disease) (2001), Cataract, COPD (chronic obstructive pulmonary disease), Coronary arteriosclerosis in native artery, Hypertension, Impaired fasting blood sugar, Ischemic cardiomyopathy, Mixed hyperlipidemia, Obesity, Tobacco dependence syndrome, Tonsillitis, Type 2 diabetes mellitus without complication, without long-term current use of insulin, and Vitamin D deficiency.   Surgical History: has a past surgical history that includes Tonsillectomy; Adenoidectomy; Other surgical history; Colonoscopy; and Colonoscopy (N/A, 2/7/2023).   Family History: family history includes Coronary artery disease in his father; Hypertension in his father; Peripheral vascular disease in his father; Stomach cancer in his mother.   Social History: reports that he has been smoking cigarettes. He has a 50 pack-year smoking history. He has quit using smokeless tobacco.  His smokeless tobacco use included chew. He reports that he does not drink alcohol and does not use drugs.      Current Outpatient Medications:     aspirin (ASPIR) 81 MG EC tablet, Take 1 tablet by mouth Daily., Disp: , Rfl:     Cholecalciferol (vitamin D3) 125 MCG (5000 UT) tablet tablet, Take 1 tablet by mouth Daily. Take one tablet by oral route daily, Disp: , Rfl:     dapagliflozin Propanediol (Farxiga) 10 MG tablet, Take 10 mg by mouth Daily. TAKE 1 TABLET BY ORAL ROUTE EVERY DAY IN THE MORNING, Disp: 90 tablet, Rfl: 3    ezetimibe (ZETIA) 10 MG tablet, Take 1 tablet by mouth Daily., Disp: 90 tablet, Rfl: 3    metFORMIN ER (GLUCOPHAGE-XR) 500 MG 24 hr tablet, Take 2 tablets by mouth 2 (Two) Times a Day With Meals., Disp: 360 tablet, Rfl: 1    metoprolol tartrate (LOPRESSOR) 50 MG tablet, TAKE ONE TABLET BY MOUTH TWICE A DAY, Disp: 180 tablet, Rfl: 1    rosuvastatin (CRESTOR) 40 MG tablet, TAKE ONE TABLET BY MOUTH DAILY, Disp: 90 tablet, Rfl: 3    sacubitril-valsartan (Entresto)  MG tablet, Take 1 tablet by mouth 2  (Two) Times a Day., Disp: 180 tablet, Rfl: 3    spironolactone (ALDACTONE) 25 MG tablet, TAKE ONE TABLET BY MOUTH DAILY, Disp: 90 tablet, Rfl: 1    torsemide (DEMADEX) 10 MG tablet, Take 1 tablet by mouth Daily., Disp: 90 tablet, Rfl: 3    vitamin B-12 (CYANOCOBALAMIN) 1000 MCG tablet, Take 1 tablet by mouth Daily. Take one tablet by oral route daily, Disp: , Rfl:     Allergies: Clopidogrel bisulfate, Clindamycin/lincomycin, and Varenicline tartrate    Physical Exam  Constitutional:       Appearance: He is obese.   HENT:      Head: Normocephalic.      Right Ear: External ear normal.      Left Ear: External ear normal.      Nose: Nose normal.   Eyes:      Pupils: Pupils are equal, round, and reactive to light.   Cardiovascular:      Rate and Rhythm: Normal rate and regular rhythm.      Heart sounds: Normal heart sounds.   Pulmonary:      Effort: Pulmonary effort is normal.      Breath sounds: Normal breath sounds.   Musculoskeletal:         General: Normal range of motion.      Cervical back: Normal range of motion.   Skin:     General: Skin is warm and dry.   Neurological:      General: No focal deficit present.      Mental Status: He is alert.   Psychiatric:         Mood and Affect: Mood normal.         Behavior: Behavior normal.         Thought Content: Thought content normal.          Result Review                   Assessment and Plan  Diagnoses and all orders for this visit:    1. Type 2 diabetes mellitus with diabetic microalbuminuria, without long-term current use of insulin (Primary)  Assessment & Plan:  Diabetes is improving with treatment.   Continue current treatment regimen.  Recommended an ADA diet.  Regular aerobic exercise.  Diabetes will be reassessed  4 mos    Orders:  -     aspirin (ASPIR) 81 MG EC tablet; Take 1 tablet by mouth Daily.  -     metFORMIN ER (GLUCOPHAGE-XR) 500 MG 24 hr tablet; Take 2 tablets by mouth 2 (Two) Times a Day With Meals.  Dispense: 360 tablet; Refill: 1  -     vitamin B-12  (CYANOCOBALAMIN) 1000 MCG tablet; Take 1 tablet by mouth Daily. Take one tablet by oral route daily    2. Hyperlipidemia LDL goal <70  Assessment & Plan:   Lipid abnormalities are stable    Plan:  Continue same medication/s without change.      Discussed medication dosage, use, side effects, and goals of treatment in detail.    Counseled patient on lifestyle modifications to help control hyperlipidemia.     Patient Treatment Goals:   LDL goal is less than 70    Followup in 6 months.      3. Essential hypertension  Assessment & Plan:  Hypertension is stable and controlled  Continue current treatment regimen.  Weight loss.  Regular aerobic exercise.  Blood pressure will be reassessed in 6 months.      4. Chronic HFrEF (heart failure with reduced ejection fraction)  Assessment & Plan:  Doing well with current regimen.   Continue current regimen from cardiology      5. B12 deficiency  Assessment & Plan:  Continue vitamin B12 supplementation.  Reviewed recent labs with normal B12      6. Prostate cancer screening  Assessment & Plan:  PSA screening up-to-date with lab work June 6, 2024      7. Ischemic cardiomyopathy  Assessment & Plan:  Good follow-up ongoing with Dr. Lam.        8. Class 3 severe obesity due to excess calories with serious comorbidity and body mass index (BMI) of 50.0 to 59.9 in adult  Assessment & Plan:  Patient's (There is no height or weight on file to calculate BMI.) indicates that they are morbidly/severely obese (BMI > 40 or > 35 with obesity - related health condition) with health conditions that include hypertension, coronary heart disease, diabetes mellitus, dyslipidemias, and osteoarthritis . Weight is unchanged. BMI  is above average; BMI management plan is completed. We discussed portion control and increasing exercise.                      Follow Up  Return in about 4 months (around 10/27/2024) for Med recheck.    Carl Solano MD

## 2024-06-27 NOTE — ASSESSMENT & PLAN NOTE
Patient's (There is no height or weight on file to calculate BMI.) indicates that they are morbidly/severely obese (BMI > 40 or > 35 with obesity - related health condition) with health conditions that include hypertension, coronary heart disease, diabetes mellitus, dyslipidemias, and osteoarthritis . Weight is unchanged. BMI  is above average; BMI management plan is completed. We discussed portion control and increasing exercise.

## 2024-06-27 NOTE — ASSESSMENT & PLAN NOTE
Diabetes is improving with treatment.   Continue current treatment regimen.  Recommended an ADA diet.  Regular aerobic exercise.  Diabetes will be reassessed  4 mos

## 2024-06-27 NOTE — TELEPHONE ENCOUNTER
Caller: Sacha Rojas    Relationship: Self    Best call back number: [phone]     What is the best time to reach you: NOW    Who are you requesting to speak with (clinical staff, provider,  specific staff member):  STAFF    What was the call regardin PEOPLE WERE WORKING ON GETTING THE CORRECT INSURANCE ENTERED. ASKING FOR THEM TO CALL BACK TO [employer] IF THIS HAS BEEN CORRECTED. THE INSURANCE NUMBERS ON FILE ARE CORRECT. THE INSURANCE IS [employer] SUPP[employer]MENT.    Is it okay if the provider responds through [employer]: NO, PLEASE CALL

## 2024-07-03 ENCOUNTER — OFFICE VISIT (OUTPATIENT)
Dept: CARDIOLOGY | Facility: CLINIC | Age: 69
End: 2024-07-03
Payer: MEDICARE

## 2024-07-03 VITALS
DIASTOLIC BLOOD PRESSURE: 74 MMHG | OXYGEN SATURATION: 94 % | HEIGHT: 66 IN | SYSTOLIC BLOOD PRESSURE: 124 MMHG | RESPIRATION RATE: 18 BRPM | HEART RATE: 79 BPM | WEIGHT: 310 LBS | BODY MASS INDEX: 49.82 KG/M2

## 2024-07-03 DIAGNOSIS — I25.5 ISCHEMIC CARDIOMYOPATHY: Chronic | ICD-10-CM

## 2024-07-03 DIAGNOSIS — I50.22 CHRONIC HFREF (HEART FAILURE WITH REDUCED EJECTION FRACTION): Primary | Chronic | ICD-10-CM

## 2024-07-03 DIAGNOSIS — E78.5 HYPERLIPIDEMIA LDL GOAL <70: Chronic | ICD-10-CM

## 2024-07-03 DIAGNOSIS — Z72.0 TOBACCO USE: ICD-10-CM

## 2024-07-03 DIAGNOSIS — I10 ESSENTIAL HYPERTENSION: Chronic | ICD-10-CM

## 2024-07-03 DIAGNOSIS — I50.22 CHRONIC HFREF (HEART FAILURE WITH REDUCED EJECTION FRACTION): Chronic | ICD-10-CM

## 2024-07-03 DIAGNOSIS — E11.9 TYPE 2 DIABETES MELLITUS WITHOUT COMPLICATION, WITHOUT LONG-TERM CURRENT USE OF INSULIN: Chronic | ICD-10-CM

## 2024-07-03 DIAGNOSIS — I25.10 CORONARY ARTERY DISEASE INVOLVING NATIVE CORONARY ARTERY OF NATIVE HEART WITHOUT ANGINA PECTORIS: ICD-10-CM

## 2024-07-03 PROBLEM — Z87.891 PERSONAL HISTORY OF TOBACCO USE: Status: RESOLVED | Noted: 2024-02-13 | Resolved: 2024-07-03

## 2024-07-03 RX ORDER — DAPAGLIFLOZIN 10 MG/1
10 TABLET, FILM COATED ORAL DAILY
Qty: 90 TABLET | Refills: 3 | Status: SHIPPED | OUTPATIENT
Start: 2024-07-03 | End: 2024-07-03 | Stop reason: SDUPTHER

## 2024-07-03 RX ORDER — ROSUVASTATIN CALCIUM 40 MG/1
40 TABLET, COATED ORAL DAILY
Qty: 90 TABLET | Refills: 3 | Status: SHIPPED | OUTPATIENT
Start: 2024-07-03

## 2024-07-03 RX ORDER — METOPROLOL TARTRATE 50 MG/1
50 TABLET, FILM COATED ORAL 2 TIMES DAILY
Qty: 180 TABLET | Refills: 3 | Status: SHIPPED | OUTPATIENT
Start: 2024-07-03

## 2024-07-03 RX ORDER — SACUBITRIL AND VALSARTAN 97; 103 MG/1; MG/1
1 TABLET, FILM COATED ORAL 2 TIMES DAILY
Qty: 180 TABLET | Refills: 3 | Status: SHIPPED | OUTPATIENT
Start: 2024-07-03

## 2024-07-03 RX ORDER — SACUBITRIL AND VALSARTAN 97; 103 MG/1; MG/1
1 TABLET, FILM COATED ORAL 2 TIMES DAILY
Qty: 180 TABLET | Refills: 3 | Status: SHIPPED | OUTPATIENT
Start: 2024-07-03 | End: 2024-07-03

## 2024-07-03 RX ORDER — SPIRONOLACTONE 25 MG/1
25 TABLET ORAL DAILY
Qty: 90 TABLET | Refills: 3 | Status: SHIPPED | OUTPATIENT
Start: 2024-07-03

## 2024-07-03 RX ORDER — EZETIMIBE 10 MG/1
10 TABLET ORAL DAILY
Qty: 90 TABLET | Refills: 3 | Status: SHIPPED | OUTPATIENT
Start: 2024-07-03

## 2024-07-03 RX ORDER — TORSEMIDE 10 MG/1
10 TABLET ORAL DAILY
Qty: 90 TABLET | Refills: 3 | Status: SHIPPED | OUTPATIENT
Start: 2024-07-03

## 2024-07-03 RX ORDER — DAPAGLIFLOZIN 10 MG/1
10 TABLET, FILM COATED ORAL DAILY
Qty: 90 TABLET | Refills: 3 | Status: SHIPPED | OUTPATIENT
Start: 2024-07-03

## 2024-07-03 NOTE — PROGRESS NOTES
[facility] [place]  [facility] CARDIOLOGY  [address] [place] [address]  Dept: [phone]  Dept Fax: [phone]    Sacha Rojas  1955    Follow Up Office Visit Note    History of Present Illness:  Sacha Rojas is a 69 y.o. male who presents to the clinic for Follow-up.ischemic cardiomyopathy-He seems steady no major SOB, no edema, no Chest pain, still smoking , has had cath in 2020 after stress test showed fixed inferior defect,  , his cath showed moderate disease LAD and CX and FFR were normal, on medical treatmentm, his Ef was low and recover somewhat last one was 32%, on Entresto 97.,103 bid, metoprolol 25 bid, torsemide 10 mg, Aldactone 25mg plus farxiga 10 mg, BP is 100.60, WKG sinus with Poor r wave septal wall no changes , will keep same approach    The following portions of the patient's history were reviewed and updated as appropriate: allergies, current medications, past family history, past medical history, past social history, past surgical history, and problem list.    Medications:  aspirin  dapagliflozin Propanediol tablet  Entresto tablet  ezetimibe  metFORMIN ER  metoprolol tartrate  rosuvastatin  spironolactone  torsemide  vitamin B-12  vitamin D3 tablet    Subjective  Allergies   Allergen Reactions    Clopidogrel Bisulfate Hives    Clindamycin/Lincomycin Rash    Varenicline Tartrate Hives        Past Medical History:   Diagnosis Date    Arthritis     Benign essential hypertension     Body mass index (BMI)40.0-44.9, adult     CAD (coronary artery disease) 2001    100% RCA 95% prox LAD    Cataract     COPD (chronic obstructive pulmonary disease)     Coronary arteriosclerosis in native artery     Hypertension     Impaired fasting blood sugar     Ischemic cardiomyopathy     He seems doing fine, no chest pain, no SOB, no edema, he underwent cardiac cath with Moderate disase 60 to 70 % CX, LAD 60 to 70 % and %; will keep medical treatment  Entresto 49.51 bid, Metoprolol 50 mg bid will add Farixga 10 mg daily.    Mixed hyperlipidemia     Obesity     Tobacco dependence syndrome     Tonsillitis     Type 2 diabetes mellitus without complication, without long-term current use of insulin     Vitamin D deficiency        Past Surgical History:   Procedure Laterality Date    ADENOIDECTOMY      COLONOSCOPY      COLONOSCOPY N/A 2/7/2023    Procedure: COLONOSCOPY;  Surgeon: Estevan Dunn MD;  Location: [facility] ENDOSCOPY;  Service: Gastroenterology;  Laterality: N/A;    OTHER SURGICAL HISTORY      STENT PLACEMENT  2001 and 2005    TONSILLECTOMY         Family History   Problem Relation Age of Onset    Stomach cancer Mother     Coronary artery disease Father     Peripheral vascular disease Father     Hypertension Father         Social History     Socioeconomic History    Marital status:    Tobacco Use    Smoking status: Heavy Smoker     Current packs/day: 1.00     Average packs/day: 1 pack/day for 50.0 years (50.0 ttl pk-yrs)     Types: Cigarettes    Smokeless tobacco: Former     Types: Chew    Tobacco comments:     Up to 4 packs per day years ago.    Vaping Use    Vaping status: Never Used   Substance and Sexual Activity    Alcohol use: Never    Drug use: Never    Sexual activity: Not Currently       Review of Systems   Constitutional: Negative.    HENT: Negative.     Respiratory: Negative.     Cardiovascular: Negative.    Endocrine: Negative.    Genitourinary: Negative.    Musculoskeletal: Negative.    Skin: Negative.    Allergic/Immunologic: Negative.    Neurological: Negative.    Hematological: Negative.    Psychiatric/Behavioral: Negative.       Cardiovascular Procedures    ECHO/MUGA:  STRESS TESTS:   CARDIAC CATH:   DEVICES:   HOLTER:   CT/MRI:   VASCULAR:   CARDIOTHORACIC:     Objective  Vitals:    07/03/24 0907   BP: 124/74   BP Location: Right arm   Patient Position: Lying   Cuff Size: Adult   Pulse: 79   Resp: 18   SpO2: 94%   Weight: (!) 141  "kg (310 lb)   Height: 167.6 cm (66\")   PainSc: 0-No pain     Body mass index is 50.04 kg/m².     Physical Exam  Vitals reviewed.   Constitutional:       Appearance: Healthy appearance. Not in distress.   Eyes:      Pupils: Pupils are equal, round, and reactive to light.   HENT:    Mouth/Throat:      Pharynx: Oropharynx is clear.   Neck:      Thyroid: Thyroid normal.      Vascular: No JVR. JVD normal.   Pulmonary:      Effort: Pulmonary effort is normal.      Breath sounds: Normal breath sounds. No wheezing. No rhonchi. No rales.   Chest:      Chest wall: Not tender to palpatation.   Cardiovascular:      PMI at left midclavicular line. Normal rate. Regular rhythm. Normal S1. Normal S2.       Murmurs: There is no murmur.      No gallop.  No click. No rub.   Pulses:     Intact distal pulses.      Carotid: 3+ bilaterally.     Radial: 3+ bilaterally.     Femoral: 3+ bilaterally.     Dorsalis pedis: 3+ bilaterally.     Posterior tibial: 3+ bilaterally.  Edema:     Peripheral edema absent.   Abdominal:      General: Bowel sounds are normal.      Palpations: Abdomen is soft.      Tenderness: There is no abdominal tenderness.   Musculoskeletal: Normal range of motion.         General: No tenderness.      Cervical back: Normal range of motion and neck supple. Skin:     General: Skin is warm and dry.   Neurological:      General: No focal deficit present.      Mental Status: Alert and oriented to person, place and time.        Diagnostic Data    ECG 12 Lead    Date/Time: 7/3/2024 9:44 AM  Performed by: Darrion Lam MD    Authorized by: Darrion Lam MD  Comparison: compared with previous ECG from 10/4/2023  Similar to previous ECG  Rhythm: sinus rhythm and sinus bradycardia  Rate: normal  BPM: 59  Conduction: non-specific intraventricular conduction delay  QRS axis: normal  Other findings: poor R wave progression    Clinical impression: abnormal EKG      Assessment and Plan  Diagnoses and all orders for this " visit:    Chronic HFrEF (heart failure with reduced ejection fraction)- Will keep same meds , seems stable, lab good  -     dapagliflozin Propanediol (Farxiga) 10 MG tablet; Take 10 mg by mouth Daily. TAKE 1 TABLET BY ORAL ROUTE EVERY DAY IN THE MORNING    Essential hypertension- BP is 100.60 on failure meds    Hyperlipidemia LDL goal <70-On crestor 40 mg and Zetia, tolerates well Ldl is good  -     rosuvastatin (CRESTOR) 40 MG tablet; Take 1 tablet by mouth Daily.    Ischemic cardiomyopathy- as above, will keep same meds, no edema, no major SOB  -     sacubitril-valsartan (Entresto)  MG tablet; Take 1 tablet by mouth 2 (Two) Times a Day.    Tobacco use- still smoking, he is tress out at home, his wife is disable     Type 2 diabetes mellitus without complication, without long-term current use of insulin    Coronary artery disease involving native coronary artery of native heart without angina pectoris- No chest pain,  moderate disease by cath 2020, LAD and CX, on ASA Metoprolol  Comments:  Stable seeing cardiology  Orders:  -     metoprolol tartrate (LOPRESSOR) 50 MG tablet; Take 1 tablet by mouth 2 (Two) Times a Day.    Other orders  -     ezetimibe (ZETIA) 10 MG tablet; Take 1 tablet by mouth Daily.  -     spironolactone (ALDACTONE) 25 MG tablet; Take 1 tablet by mouth Daily.  -     torsemide (DEMADEX) 10 MG tablet; Take 1 tablet by mouth Daily.         Return in about 6 months (around 1/3/2025) for Recheck with Dr. Lam.    Darrion Lam MD  07/03/2024

## 2024-09-03 DIAGNOSIS — I25.5 ISCHEMIC CARDIOMYOPATHY: Chronic | ICD-10-CM

## 2024-09-03 RX ORDER — SACUBITRIL AND VALSARTAN 97; 103 MG/1; MG/1
1 TABLET, FILM COATED ORAL 2 TIMES DAILY
Qty: 180 TABLET | Refills: 3 | Status: SHIPPED | OUTPATIENT
Start: 2024-09-03

## 2024-09-03 NOTE — TELEPHONE ENCOUNTER
Sent new prescription for Entresto to [facility] for he [employer].

## 2024-09-18 ENCOUNTER — TELEPHONE (OUTPATIENT)
Dept: CARDIOLOGY | Facility: CLINIC | Age: 69
End: 2024-09-18

## 2024-09-18 NOTE — TELEPHONE ENCOUNTER
Caller: Sacha Rojas    Relationship: Self    Best call back number: [phone]    What is the best time to reach you: ANY    What was the call regarding: PATIENT NEEDS RIGHT KNEE REPLACEMENT SURGERY AND WANTS DR. CANALES TO RECOMMEND A  DOCTOR FOR THE PROCEDURE AND IF HE CAN HAVE SURGERY DUE TO HIS HEART ISSUES. PLEASE CALL THE PATIENT TO ADVISE. WANTS TO GET IN WITH ORTHO AS SOON AS HE CAN.     Is it okay if the provider responds through MyChart: NO

## 2024-09-20 ENCOUNTER — TELEPHONE (OUTPATIENT)
Dept: CARDIOLOGY | Facility: CLINIC | Age: 69
End: 2024-09-20
Payer: COMMERCIAL

## 2024-09-30 ENCOUNTER — OFFICE VISIT (OUTPATIENT)
Dept: CARDIOLOGY | Facility: CLINIC | Age: 69
End: 2024-09-30
Payer: MEDICARE

## 2024-09-30 VITALS
BODY MASS INDEX: 49.34 KG/M2 | SYSTOLIC BLOOD PRESSURE: 142 MMHG | RESPIRATION RATE: 16 BRPM | OXYGEN SATURATION: 98 % | TEMPERATURE: 97 F | WEIGHT: 307 LBS | DIASTOLIC BLOOD PRESSURE: 74 MMHG | HEIGHT: 66 IN | HEART RATE: 66 BPM

## 2024-09-30 DIAGNOSIS — Z72.0 TOBACCO USE: ICD-10-CM

## 2024-09-30 DIAGNOSIS — E11.9 TYPE 2 DIABETES MELLITUS WITHOUT COMPLICATION, WITHOUT LONG-TERM CURRENT USE OF INSULIN: Chronic | ICD-10-CM

## 2024-09-30 DIAGNOSIS — I50.22 CHRONIC HFREF (HEART FAILURE WITH REDUCED EJECTION FRACTION): Primary | Chronic | ICD-10-CM

## 2024-09-30 DIAGNOSIS — I10 ESSENTIAL HYPERTENSION: Chronic | ICD-10-CM

## 2024-09-30 DIAGNOSIS — I25.5 ISCHEMIC CARDIOMYOPATHY: Chronic | ICD-10-CM

## 2024-09-30 DIAGNOSIS — E78.5 HYPERLIPIDEMIA LDL GOAL <70: Chronic | ICD-10-CM

## 2024-09-30 PROCEDURE — 99214 OFFICE O/P EST MOD 30 MIN: CPT | Performed by: INTERNAL MEDICINE

## 2024-09-30 PROCEDURE — 1160F RVW MEDS BY RX/DR IN RCRD: CPT | Performed by: INTERNAL MEDICINE

## 2024-09-30 PROCEDURE — 1159F MED LIST DOCD IN RCRD: CPT | Performed by: INTERNAL MEDICINE

## 2024-09-30 PROCEDURE — 3078F DIAST BP <80 MM HG: CPT | Performed by: INTERNAL MEDICINE

## 2024-09-30 PROCEDURE — 93000 ELECTROCARDIOGRAM COMPLETE: CPT | Performed by: INTERNAL MEDICINE

## 2024-09-30 PROCEDURE — 3077F SYST BP >= 140 MM HG: CPT | Performed by: INTERNAL MEDICINE

## 2024-09-30 NOTE — PROGRESS NOTES
[facility] [place]  [facility] CARDIOLOGY  [address] [place] [address]  Dept: [phone]  Dept Fax: [phone]    Sacha Rojas  1955    Follow Up Office Visit Note    History of Present Illness:  Sacha Rojas is a 69 y.o. male who presents to the clinic for Follow-up. Ischemic cardiomyopathy- He denies any chest pain, has some SOB on big exertion, walking over 1 block,,his last Ef was 32%, he has the cardiac cath in 2020 with 100% RCA, and moderate disease LAD and CX FFR negative no stents on Entresto 97.103 bid plus Metoprolol 25 bid, Torsemide 10 mg, Aldactone 25 mg and Farxiga 10 mg, , he is here for preop,planning to have right knee replacement ,  EKG sinus HR 62, he is still smoking, will get a Lexiscan nuclear, prior nuclear has shown fixed inferior defect,, the procedure is a low risk but he is at moderate risk     The following portions of the patient's history were reviewed and updated as appropriate: allergies, current medications, past family history, past medical history, past social history, past surgical history, and problem list.    Medications:  aspirin  dapagliflozin Propanediol tablet  Entresto tablet  ezetimibe  metFORMIN ER  metoprolol tartrate  rosuvastatin  spironolactone  torsemide  vitamin B-12  vitamin D3 tablet    Subjective  Allergies   Allergen Reactions   • Clopidogrel Bisulfate Hives   • Clindamycin/Lincomycin Rash   • Varenicline Tartrate Hives        Past Medical History:   Diagnosis Date   • Arthritis    • Benign essential hypertension    • Body mass index (BMI)40.0-44.9, adult    • CAD (coronary artery disease) 2001    100% RCA 95% prox LAD   • Cataract    • COPD (chronic obstructive pulmonary disease)    • Coronary arteriosclerosis in native artery    • Hypertension    • Impaired fasting blood sugar    • Ischemic cardiomyopathy     He seems doing fine, no chest pain, no SOB, no edema, he underwent cardiac cath with Moderate  disase 60 to 70 % CX, LAD 60 to 70 % and %; will keep medical treatment Entresto 49.51 bid, Metoprolol 50 mg bid will add Farixga 10 mg daily.   • Mixed hyperlipidemia    • Obesity    • Tobacco dependence syndrome    • Tonsillitis    • Type 2 diabetes mellitus without complication, without long-term current use of insulin    • Vitamin D deficiency        Past Surgical History:   Procedure Laterality Date   • ADENOIDECTOMY     • COLONOSCOPY     • COLONOSCOPY N/A 2/7/2023    Procedure: COLONOSCOPY;  Surgeon: Estevan Dunn MD;  Location: [facility] ENDOSCOPY;  Service: Gastroenterology;  Laterality: N/A;   • OTHER SURGICAL HISTORY      STENT PLACEMENT  2001 and 2005   • TONSILLECTOMY         Family History   Problem Relation Age of Onset   • Stomach cancer Mother    • Coronary artery disease Father    • Peripheral vascular disease Father    • Hypertension Father         Social History     Socioeconomic History   • Marital status:    Tobacco Use   • Smoking status: Heavy Smoker     Current packs/day: 1.00     Average packs/day: 1 pack/day for 50.0 years (50.0 ttl pk-yrs)     Types: Cigarettes   • Smokeless tobacco: Former     Types: Chew   • Tobacco comments:     Up to 4 packs per day years ago.    Vaping Use   • Vaping status: Never Used   Substance and Sexual Activity   • Alcohol use: Never   • Drug use: Never   • Sexual activity: Not Currently       Review of Systems   Constitutional: Negative.    HENT: Negative.     Respiratory:  Positive for shortness of breath.    Cardiovascular: Negative.    Endocrine: Negative.    Genitourinary: Negative.    Musculoskeletal: Negative.    Skin: Negative.    Allergic/Immunologic: Negative.    Neurological: Negative.    Hematological: Negative.    Psychiatric/Behavioral: Negative.       Cardiovascular Procedures    ECHO/MUGA:  STRESS TESTS:   CARDIAC CATH:   DEVICES:   HOLTER:   CT/MRI:   VASCULAR:   CARDIOTHORACIC:     Objective  Vitals:    09/30/24 1356   BP:  "142/74   BP Location: Right arm   Patient Position: Lying   Cuff Size: Adult   Pulse: 66   Resp: 16   Temp: 97 °F (36.1 °C)   TempSrc: Infrared   SpO2: 98%   Weight: (!) 139 kg (307 lb)   Height: 167.6 cm (66\")   PainSc: 0-No pain     Body mass index is 49.55 kg/m².     Physical Exam  Vitals reviewed.   Constitutional:       Appearance: Healthy appearance. Not in distress.   Eyes:      Pupils: Pupils are equal, round, and reactive to light.   HENT:    Mouth/Throat:      Pharynx: Oropharynx is clear.   Neck:      Thyroid: Thyroid normal.      Vascular: No JVR. JVD normal.   Pulmonary:      Effort: Pulmonary effort is normal.      Breath sounds: Normal breath sounds. No wheezing. No rhonchi. No rales.   Chest:      Chest wall: Not tender to palpatation.   Cardiovascular:      PMI at left midclavicular line. Normal rate. Regular rhythm. Normal S1. Normal S2.       Murmurs: There is no murmur.      No gallop.  No click. No rub.   Pulses:     Intact distal pulses.      Carotid: 3+ bilaterally.     Radial: 3+ bilaterally.     Femoral: 3+ bilaterally.     Dorsalis pedis: 3+ bilaterally.     Posterior tibial: 3+ bilaterally.  Edema:     Peripheral edema absent.   Abdominal:      General: Bowel sounds are normal.      Palpations: Abdomen is soft.      Tenderness: There is no abdominal tenderness.   Musculoskeletal: Normal range of motion.         General: No tenderness.      Cervical back: Normal range of motion and neck supple. Skin:     General: Skin is warm and dry.   Neurological:      General: No focal deficit present.      Mental Status: Alert and oriented to person, place and time.        Diagnostic Data    ECG 12 Lead    Date/Time: 9/30/2024 2:27 PM  Performed by: Darrion Lam MD    Authorized by: Darrion Lam MD  Comparison: compared with previous ECG from 7/3/2024  Similar to previous ECG  Rhythm: sinus rhythm  Rate: normal  BPM: 62  QRS axis: normal  Other findings: poor R wave " progression    Clinical impression: abnormal EKG        Assessment and Plan  Diagnoses and all orders for this visit:    Chronic HFrEF (heart failure with reduced ejection fraction)- , on Entresto 97.,103 bid, coreg 25 bid, Farxiga 10 mg and Aldactone 25mg, - no changes   -     Stress Test With Myocardial Perfusion Two Day; Future    Essential hypertension- BP is 115l60 will keep same failure meds    Hyperlipidemia LDL goal <70- On Crestor 40 mg and Zetia,Ldl 57    Ischemic cardiomyopathy- On ASA, the Ef improved to 32%, on failure meds  -     Stress Test With Myocardial Perfusion Two Day; Future    Tobacco use- Still smoking 1 pack daily went down from4 packages    Type 2 diabetes mellitus without complication, without long-term current use of insulin         Return in about 6 months (around 3/30/2025) for Recheck with Dr. Lam.    Darrion Lam MD  09/30/2024

## 2024-10-01 ENCOUNTER — TELEPHONE (OUTPATIENT)
Dept: CARDIOLOGY | Facility: CLINIC | Age: 69
End: 2024-10-01
Payer: MEDICARE

## 2024-10-01 NOTE — TELEPHONE ENCOUNTER
Caller: Sacha Rojas    Relationship: Self    Best call back number: [phone]    What is the best time to reach you: ANY    What was the call regarding: PATIENT'S DAUGHTER AG HAD CALLED TO SPEAK WITH THE NURSE ABOUT THE PATIENT'S UPCOMING TESTS. SHE COULD NOT BE WT BECAUSE THE BOXES FOR THE VERBAL ARE NOT CHECKED AND IN THE PRIOR CONVERSATION HUB COULD NOT REVEAL INFORMATION TO HER BECAUSE OF THAT. THE PATIENT IS CALLING BACK TO ADVISE THE NURSE TO CONTACT HIS DAUGHTER AND LVM WITH ANY INSTRUCTIONS IF THERE IS NO ANSWER.     Is it okay if the provider responds through MyChart: NO

## 2024-10-18 ENCOUNTER — TELEPHONE (OUTPATIENT)
Dept: CARDIOLOGY | Facility: CLINIC | Age: 69
End: 2024-10-18
Payer: MEDICARE

## 2024-10-18 NOTE — TELEPHONE ENCOUNTER
PT STATES HE HAD A STRESS TEST DONE LAST OV. [facility] IS STILL WAITING FOR THOSE RESULTS TO BE SENT OVER SO PT CAN SCHEDULE HIS SURGERY

## 2024-10-25 ENCOUNTER — TELEPHONE (OUTPATIENT)
Dept: CARDIOLOGY | Facility: CLINIC | Age: 69
End: 2024-10-25
Payer: MEDICARE

## 2024-10-25 NOTE — TELEPHONE ENCOUNTER
Caller: Sacha Rojas    Relationship: Self    Best call back number:[phone]    What is the best time to reach you: ANY    Who are you requesting to speak with (clinical staff, provider,  specific staff member): CLINICAL     What was the call regarding: PATIENT IS STATING THAT HIS SURGEON HAS YET TO GET THE STRESS TEST RESULTS VIA FAX WHICH IS WHAT THEY NEED, PLEASE CALL PATIENT AND CLARIFY    FAX:[phone]    Is it okay if the provider responds through [facility]: PLEASE

## 2024-10-25 NOTE — TELEPHONE ENCOUNTER
Spoke with Mr Rojas and advised I would refax the paper work to [facility].  My fax on 10/18 did show as going through.

## 2024-10-28 ENCOUNTER — TELEPHONE (OUTPATIENT)
Dept: CARDIOLOGY | Facility: CLINIC | Age: 69
End: 2024-10-28
Payer: MEDICARE

## 2024-10-28 NOTE — TELEPHONE ENCOUNTER
Left a message for Mr Rojas that I called [facility] and they have the cardiac clearance but waiting on the medical clearance from your primary.  I did give them Dr Solano's number.

## 2024-10-29 ENCOUNTER — OFFICE VISIT (OUTPATIENT)
Dept: FAMILY MEDICINE CLINIC | Facility: CLINIC | Age: 69
End: 2024-10-29
Payer: MEDICARE

## 2024-10-29 ENCOUNTER — TELEPHONE (OUTPATIENT)
Dept: FAMILY MEDICINE CLINIC | Facility: CLINIC | Age: 69
End: 2024-10-29

## 2024-10-29 VITALS
SYSTOLIC BLOOD PRESSURE: 130 MMHG | HEIGHT: 66 IN | BODY MASS INDEX: 48.86 KG/M2 | WEIGHT: 304 LBS | OXYGEN SATURATION: 97 % | DIASTOLIC BLOOD PRESSURE: 70 MMHG | HEART RATE: 68 BPM

## 2024-10-29 DIAGNOSIS — I10 ESSENTIAL HYPERTENSION: Chronic | ICD-10-CM

## 2024-10-29 DIAGNOSIS — Z23 NEEDS FLU SHOT: ICD-10-CM

## 2024-10-29 DIAGNOSIS — E66.813 CLASS 3 SEVERE OBESITY DUE TO EXCESS CALORIES WITH SERIOUS COMORBIDITY AND BODY MASS INDEX (BMI) OF 45.0 TO 49.9 IN ADULT: ICD-10-CM

## 2024-10-29 DIAGNOSIS — Z01.810 PREOP CARDIOVASCULAR EXAM: ICD-10-CM

## 2024-10-29 DIAGNOSIS — E11.29 TYPE 2 DIABETES MELLITUS WITH DIABETIC MICROALBUMINURIA, WITHOUT LONG-TERM CURRENT USE OF INSULIN: ICD-10-CM

## 2024-10-29 DIAGNOSIS — E66.01 CLASS 3 SEVERE OBESITY DUE TO EXCESS CALORIES WITH SERIOUS COMORBIDITY AND BODY MASS INDEX (BMI) OF 45.0 TO 49.9 IN ADULT: ICD-10-CM

## 2024-10-29 DIAGNOSIS — I25.5 ISCHEMIC CARDIOMYOPATHY: Chronic | ICD-10-CM

## 2024-10-29 DIAGNOSIS — G89.29 CHRONIC PAIN OF RIGHT KNEE: Primary | ICD-10-CM

## 2024-10-29 DIAGNOSIS — E78.5 HYPERLIPIDEMIA LDL GOAL <70: Chronic | ICD-10-CM

## 2024-10-29 DIAGNOSIS — I50.22 CHRONIC HFREF (HEART FAILURE WITH REDUCED EJECTION FRACTION): Chronic | ICD-10-CM

## 2024-10-29 DIAGNOSIS — M25.561 CHRONIC PAIN OF RIGHT KNEE: Primary | ICD-10-CM

## 2024-10-29 DIAGNOSIS — R80.9 TYPE 2 DIABETES MELLITUS WITH DIABETIC MICROALBUMINURIA, WITHOUT LONG-TERM CURRENT USE OF INSULIN: ICD-10-CM

## 2024-10-29 LAB
EXPIRATION DATE: ABNORMAL
HBA1C MFR BLD: 6.2 % (ref 4.5–5.7)
Lab: ABNORMAL

## 2024-10-29 PROCEDURE — 3044F HG A1C LEVEL LT 7.0%: CPT | Performed by: FAMILY MEDICINE

## 2024-10-29 PROCEDURE — 1160F RVW MEDS BY RX/DR IN RCRD: CPT | Performed by: FAMILY MEDICINE

## 2024-10-29 PROCEDURE — 99214 OFFICE O/P EST MOD 30 MIN: CPT | Performed by: FAMILY MEDICINE

## 2024-10-29 PROCEDURE — 1126F AMNT PAIN NOTED NONE PRSNT: CPT | Performed by: FAMILY MEDICINE

## 2024-10-29 PROCEDURE — 83036 HEMOGLOBIN GLYCOSYLATED A1C: CPT | Performed by: FAMILY MEDICINE

## 2024-10-29 PROCEDURE — 90662 IIV NO PRSV INCREASED AG IM: CPT | Performed by: FAMILY MEDICINE

## 2024-10-29 PROCEDURE — 3078F DIAST BP <80 MM HG: CPT | Performed by: FAMILY MEDICINE

## 2024-10-29 PROCEDURE — G0008 ADMIN INFLUENZA VIRUS VAC: HCPCS | Performed by: FAMILY MEDICINE

## 2024-10-29 PROCEDURE — 1159F MED LIST DOCD IN RCRD: CPT | Performed by: FAMILY MEDICINE

## 2024-10-29 PROCEDURE — 3075F SYST BP GE 130 - 139MM HG: CPT | Performed by: FAMILY MEDICINE

## 2024-10-29 RX ORDER — LANOLIN ALCOHOL/MO/W.PET/CERES
1000 CREAM (GRAM) TOPICAL DAILY
Start: 2024-10-29

## 2024-10-29 RX ORDER — ASPIRIN 81 MG/1
81 TABLET ORAL DAILY
Start: 2024-10-29

## 2024-10-29 RX ORDER — METFORMIN HYDROCHLORIDE 500 MG/1
1000 TABLET, EXTENDED RELEASE ORAL 2 TIMES DAILY WITH MEALS
Qty: 360 TABLET | Refills: 1 | Status: SHIPPED | OUTPATIENT
Start: 2024-10-29

## 2024-10-29 NOTE — ASSESSMENT & PLAN NOTE
Dr. Valladares paged via [facility] regarding Sepsis alert triggered. Patient is more lethargic now. BS 80 and VSS.   Hypertension is stable and controlled  Continue current treatment regimen.  Weight loss.  Regular aerobic exercise.  Blood pressure will be reassessed  at next regular follow-up appointment .

## 2024-10-29 NOTE — ASSESSMENT & PLAN NOTE
Discussed plans for right knee replacement.    He does understand the risks and benefits of his proposed elective surgery (as explained by his surgeon) and wishes to proceed with right knee replacement.    We will check a preop CBC and CMP today.  A1c by fingerstick returned excellent at 6.2.    Cardiology clearance already completed by Dr. Lam including EKG and low risk stress testing.    He does appear to be at low intraoperative risk for cardiovascular complication and understands the importance of stopping his aspirin and all NSAIDs at least 1 week before surgery or as explained by his surgeon.

## 2024-10-29 NOTE — PROGRESS NOTES
"Chief Complaint  Preop clearance for R knee replacement with Dr Tompkins, Diabetes, hypertension, cardiomyopathy, B12 deficiency    Subjective    History of Present Illness:  Sacha Rojas is a 69 y.o. male who presents today for follow-up regarding diabetes, hypertension, coronary artery disease, and preop clearance for his R TKA with Dr Tompkins.    He did already have cardiology consultation and clearance, including EKG, with Dr Lam last month and underwent a stress test with findings of a low-risk study in preparation for his elective knee replacement surgery.     They are planning on gen anesthesia with a block.  He does understand the risks/benefits of his elective surgery, as explained by his surgeon, and wishes to proceed given worsening R knee pain despite non-surgical treatment options.      Doing well with ongoing cardiology follow-up related to his coronary artery disease, chronic congestive heart failure, hyperlipidemia, and hypertension.     Last A1c in June was good at 6.9.  Repeat finger-stick A1c today: 6.2!!!    Low-dose lung CT up-to-date February 19, 2024 with no suspicious pulmonary nodules and recommended annual screening.  Plans for repeat low-dose lung CT February 2025    Ready for flu shot    Discussed need to stop ASA 1 week before surgery and no NSAIDs  (tylenol only for pain) 1 week before surgery (or as instructed by his surgeon) to minimize bleeding risk    No problems with gen anesthesia in the past     Objective   Vital Signs:   /70   Pulse 68   Ht 167.6 cm (66\")   Wt (!) 138 kg (304 lb)   SpO2 97%   BMI 49.07 kg/m²     Review of Systems   Constitutional:  Negative for appetite change, chills and fever.   HENT:  Negative for hearing loss.    Eyes:  Negative for blurred vision.   Respiratory:  Negative for chest tightness.    Cardiovascular:  Negative for chest pain.   Gastrointestinal:  Negative for abdominal pain.   Musculoskeletal:  Positive for arthralgias and gait " problem.        Worsening R knee pain.  Limping gait due to pain   Skin:  Negative for rash.   Psychiatric/Behavioral:  Negative for depressed mood.        Past History:  Medical History: has a past medical history of Arthritis, Benign essential hypertension, Body mass index (BMI)40.0-44.9, adult, CAD (coronary artery disease) (2001), Cataract, COPD (chronic obstructive pulmonary disease), Coronary arteriosclerosis in native artery, Hypertension, Impaired fasting blood sugar, Ischemic cardiomyopathy, Mixed hyperlipidemia, Obesity, Tobacco dependence syndrome, Tonsillitis, Type 2 diabetes mellitus without complication, without long-term current use of insulin, and Vitamin D deficiency.   Surgical History: has a past surgical history that includes Tonsillectomy; Adenoidectomy; Other surgical history; Colonoscopy; and Colonoscopy (N/A, 2/7/2023).   Family History: family history includes Coronary artery disease in his father; Hypertension in his father; Peripheral vascular disease in his father; Stomach cancer in his mother.   Social History: reports that he has been smoking cigarettes. He has a 50 pack-year smoking history. He has quit using smokeless tobacco.  His smokeless tobacco use included chew. He reports that he does not drink alcohol and does not use drugs.      Current Outpatient Medications:     aspirin (ASPIR) 81 MG EC tablet, Take 1 tablet by mouth Daily., Disp: , Rfl:     Cholecalciferol (vitamin D3) 125 MCG (5000 UT) tablet tablet, Take 1 tablet by mouth Daily. Take one tablet by oral route daily, Disp: , Rfl:     dapagliflozin Propanediol (Farxiga) 10 MG tablet, Take 10 mg by mouth Daily. TAKE 1 TABLET BY ORAL ROUTE EVERY DAY IN THE MORNING, Disp: 90 tablet, Rfl: 3    ezetimibe (ZETIA) 10 MG tablet, Take 1 tablet by mouth Daily., Disp: 90 tablet, Rfl: 3    metFORMIN ER (GLUCOPHAGE-XR) 500 MG 24 hr tablet, Take 2 tablets by mouth 2 (Two) Times a Day With Meals., Disp: 360 tablet, Rfl: 1    metoprolol  tartrate (LOPRESSOR) 50 MG tablet, Take 1 tablet by mouth 2 (Two) Times a Day., Disp: 180 tablet, Rfl: 3    rosuvastatin (CRESTOR) 40 MG tablet, Take 1 tablet by mouth Daily., Disp: 90 tablet, Rfl: 3    sacubitril-valsartan (Entresto)  MG tablet, Take 1 tablet by mouth 2 (Two) Times a Day., Disp: 180 tablet, Rfl: 3    spironolactone (ALDACTONE) 25 MG tablet, Take 1 tablet by mouth Daily., Disp: 90 tablet, Rfl: 3    torsemide (DEMADEX) 10 MG tablet, Take 1 tablet by mouth Daily., Disp: 90 tablet, Rfl: 3    vitamin B-12 (CYANOCOBALAMIN) 1000 MCG tablet, Take 1 tablet by mouth Daily. Take one tablet by oral route daily, Disp: , Rfl:     Allergies: Clopidogrel bisulfate, Clindamycin/lincomycin, and Varenicline tartrate    Physical Exam  Constitutional:       Appearance: He is obese.   HENT:      Head: Normocephalic.      Right Ear: External ear normal.      Left Ear: External ear normal.      Nose: Nose normal.   Eyes:      Pupils: Pupils are equal, round, and reactive to light.   Cardiovascular:      Rate and Rhythm: Normal rate and regular rhythm.      Heart sounds: Normal heart sounds.   Pulmonary:      Effort: Pulmonary effort is normal.      Breath sounds: Normal breath sounds.   Musculoskeletal:      Cervical back: Normal range of motion.      Comments: Limping gait, R knee with crepitus and OA changes, no calor/erythema.    Skin:     General: Skin is warm and dry.   Neurological:      General: No focal deficit present.      Mental Status: He is alert.   Psychiatric:         Mood and Affect: Mood normal.         Behavior: Behavior normal.         Thought Content: Thought content normal.          Result Review                   Assessment and Plan  Diagnoses and all orders for this visit:    1. Chronic pain of right knee (Primary)  Assessment & Plan:  Discussed plans for right knee replacement.    He does understand the risks and benefits of his proposed elective surgery (as explained by his surgeon) and  wishes to proceed with right knee replacement.    We will check a preop CBC and CMP today.  A1c by fingerstick returned excellent at 6.2.    Cardiology clearance already completed by Dr. Lam including EKG and low risk stress testing.    He does appear to be at low intraoperative risk for cardiovascular complication and understands the importance of stopping his aspirin and all NSAIDs at least 1 week before surgery or as explained by his surgeon.          2. Preop cardiovascular exam  Assessment & Plan:  See above    Orders:  -     CBC & Differential; Future  -     Comprehensive Metabolic Panel; Future    3. Type 2 diabetes mellitus with diabetic microalbuminuria, without long-term current use of insulin  Assessment & Plan:  Diabetes is improving with treatment.   Continue current treatment regimen.  Recommended an ADA diet.  Regular aerobic exercise.  Diabetes will be reassessed  4 mos    Orders:  -     POCT glycated hemoglobin, total  -     CBC & Differential; Future  -     Comprehensive Metabolic Panel; Future  -     aspirin (ASPIR) 81 MG EC tablet; Take 1 tablet by mouth Daily.  -     metFORMIN ER (GLUCOPHAGE-XR) 500 MG 24 hr tablet; Take 2 tablets by mouth 2 (Two) Times a Day With Meals.  Dispense: 360 tablet; Refill: 1  -     vitamin B-12 (CYANOCOBALAMIN) 1000 MCG tablet; Take 1 tablet by mouth Daily. Take one tablet by oral route daily    4. Essential hypertension  Assessment & Plan:  Hypertension is stable and controlled  Continue current treatment regimen.  Weight loss.  Regular aerobic exercise.  Blood pressure will be reassessed  at next regular follow-up appointment .    Orders:  -     CBC & Differential; Future  -     Comprehensive Metabolic Panel; Future    5. Hyperlipidemia LDL goal <70  Assessment & Plan:   Lipid abnormalities are stable    Plan:  Continue same medication/s without change.      Discussed medication dosage, use, side effects, and goals of treatment in detail.    Counseled patient  on lifestyle modifications to help control hyperlipidemia.     Patient Treatment Goals:   LDL goal is less than 70    Followup at the next regular appointment.    Orders:  -     CBC & Differential; Future  -     Comprehensive Metabolic Panel; Future    6. Ischemic cardiomyopathy  Assessment & Plan:  Good follow-up ongoing with Dr. Lam.      Orders:  -     CBC & Differential; Future  -     Comprehensive Metabolic Panel; Future    7. Chronic HFrEF (heart failure with reduced ejection fraction)  Assessment & Plan:  Doing well with current regimen.   Continue current regimen from cardiology    Orders:  -     CBC & Differential; Future  -     Comprehensive Metabolic Panel; Future    8. Class 3 severe obesity due to excess calories with serious comorbidity and body mass index (BMI) of 45.0 to 49.9 in adult  Assessment & Plan:  Patient's (Body mass index is 49.07 kg/m².) indicates that they are morbidly/severely obese (BMI > 40 or > 35 with obesity - related health condition) with health conditions that include hypertension, coronary heart disease, diabetes mellitus, dyslipidemias, and osteoarthritis . Weight is unchanged. BMI  is above average; BMI management plan is completed. We discussed portion control and increasing exercise.       9. Needs flu shot  -     Fluzone High-Dose 65+yrs ([id number])                   Follow Up  Return in about 4 months (around 2/28/2025) for Medicare Wellness.    Carl Solano MD

## 2024-10-29 NOTE — ASSESSMENT & PLAN NOTE
Patient's (Body mass index is 49.07 kg/m².) indicates that they are morbidly/severely obese (BMI > 40 or > 35 with obesity - related health condition) with health conditions that include hypertension, coronary heart disease, diabetes mellitus, dyslipidemias, and osteoarthritis . Weight is unchanged. BMI  is above average; BMI management plan is completed. We discussed portion control and increasing exercise.

## 2024-10-30 ENCOUNTER — TELEPHONE (OUTPATIENT)
Dept: FAMILY MEDICINE CLINIC | Facility: CLINIC | Age: 69
End: 2024-10-30
Payer: MEDICARE

## 2024-10-30 LAB
ALBUMIN SERPL-MCNC: 4.6 G/DL (ref 3.9–4.9)
ALP SERPL-CCNC: 89 IU/L (ref 44–121)
ALT SERPL-CCNC: 23 IU/L (ref 0–44)
AST SERPL-CCNC: 22 IU/L (ref 0–40)
BASOPHILS # BLD AUTO: 0 X10E3/UL (ref 0–0.2)
BASOPHILS NFR BLD AUTO: 0 %
BILIRUB SERPL-MCNC: 0.3 MG/DL (ref 0–1.2)
BUN SERPL-MCNC: 23 MG/DL (ref 8–27)
BUN/CREAT SERPL: 23 (ref 10–24)
CALCIUM SERPL-MCNC: 9.9 MG/DL (ref 8.6–10.2)
CHLORIDE SERPL-SCNC: 99 MMOL/L (ref 96–106)
CO2 SERPL-SCNC: 23 MMOL/L (ref 20–29)
CREAT SERPL-MCNC: 1 MG/DL (ref 0.76–1.27)
EGFRCR SERPLBLD CKD-EPI 2021: 82 ML/MIN/1.73
EOSINOPHIL # BLD AUTO: 0.2 X10E3/UL (ref 0–0.4)
EOSINOPHIL NFR BLD AUTO: 2 %
ERYTHROCYTE [DISTWIDTH] IN BLOOD BY AUTOMATED COUNT: 15.7 % (ref 11.6–15.4)
GLOBULIN SER CALC-MCNC: 2.2 G/DL (ref 1.5–4.5)
GLUCOSE SERPL-MCNC: 96 MG/DL (ref 70–99)
HCT VFR BLD AUTO: 45.1 % (ref 37.5–51)
HGB BLD-MCNC: 14.8 G/DL (ref 13–17.7)
IMM GRANULOCYTES # BLD AUTO: 0 X10E3/UL (ref 0–0.1)
IMM GRANULOCYTES NFR BLD AUTO: 0 %
LYMPHOCYTES # BLD AUTO: 2.5 X10E3/UL (ref 0.7–3.1)
LYMPHOCYTES NFR BLD AUTO: 27 %
MCH RBC QN AUTO: 29.1 PG (ref 26.6–33)
MCHC RBC AUTO-ENTMCNC: 32.8 G/DL (ref 31.5–35.7)
MCV RBC AUTO: 89 FL (ref 79–97)
MONOCYTES # BLD AUTO: 0.7 X10E3/UL (ref 0.1–0.9)
MONOCYTES NFR BLD AUTO: 8 %
NEUTROPHILS # BLD AUTO: 5.6 X10E3/UL (ref 1.4–7)
NEUTROPHILS NFR BLD AUTO: 63 %
PLATELET # BLD AUTO: 204 X10E3/UL (ref 150–450)
POTASSIUM SERPL-SCNC: 4.5 MMOL/L (ref 3.5–5.2)
PROT SERPL-MCNC: 6.8 G/DL (ref 6–8.5)
RBC # BLD AUTO: 5.08 X10E6/UL (ref 4.14–5.8)
SODIUM SERPL-SCNC: 136 MMOL/L (ref 134–144)
WBC # BLD AUTO: 9.1 X10E3/UL (ref 3.4–10.8)

## 2024-10-30 NOTE — TELEPHONE ENCOUNTER
----- Message from Carl Solano sent at 10/30/2024 12:12 PM EDT -----  THE MESSAGE BELOW IS ABLE TO BE GIVEN BY THE HUB.  THE HUB MAY SCHEDULE A FOLLOW-UP VISIT FOR THE PATIENT IF INDICATED IN THE MESSAGE BELOW...    Please call patient with recent preoperative lab work:    His blood count returned normal with no anemia or evidence for infection.    His comprehensive metabolic panel returned normal with excellent kidney function, normal liver enzymes, good electrolytes, and normal fasting blood sugar.    Please fax his preop packet to [facility] at [phone].  And let him know after this has been faxed so he can follow-up with them regarding the paperwork requirements for his upcoming right knee replacement.

## 2024-10-30 NOTE — PROGRESS NOTES
THE MESSAGE BELOW IS ABLE TO BE GIVEN BY THE HUB.  THE HUB MAY SCHEDULE A FOLLOW-UP VISIT FOR THE PATIENT IF INDICATED IN THE MESSAGE BELOW...    Please call patient with recent preoperative lab work:    His blood count returned normal with no anemia or evidence for infection.    His comprehensive metabolic panel returned normal with excellent kidney function, normal liver enzymes, good electrolytes, and normal fasting blood sugar.    Please fax his preop packet to [facility] at [phone].  And let him know after this has been faxed so he can follow-up with them regarding the paperwork requirements for his upcoming right knee replacement.

## 2024-11-04 ENCOUNTER — TELEPHONE (OUTPATIENT)
Dept: CARDIOLOGY | Facility: CLINIC | Age: 69
End: 2024-11-04
Payer: MEDICARE

## 2024-11-04 NOTE — TELEPHONE ENCOUNTER
Spoke with Mr Rojas and advised he has been approved for 2025 to receive her Farxiga from [facility] for free.  He verbalized understanding.

## 2024-12-18 ENCOUNTER — TELEPHONE (OUTPATIENT)
Dept: CARDIOLOGY | Facility: CLINIC | Age: 69
End: 2024-12-18
Payer: MEDICARE

## 2024-12-18 NOTE — TELEPHONE ENCOUNTER
Spoke with Mr. Rojas to clear him for his R knee surgery, however, he states he had the surgery this past Thursday and doing well.

## 2025-01-30 ENCOUNTER — TELEPHONE (OUTPATIENT)
Dept: CARDIOLOGY | Facility: CLINIC | Age: 70
End: 2025-01-30

## 2025-01-30 NOTE — TELEPHONE ENCOUNTER
[facility] - DIDN'T RECEIVE A SIGNATURE FOR PT ASSISTANCE     THEY ARE REFAXING    FAX NUMBER [phone]

## 2025-02-18 ENCOUNTER — TELEPHONE (OUTPATIENT)
Dept: CARDIOLOGY | Facility: CLINIC | Age: 70
End: 2025-02-18
Payer: MEDICARE

## 2025-02-18 NOTE — TELEPHONE ENCOUNTER
Hub staff attempted to follow warm transfer process and was unsuccessful     Caller: [facility]    Relationship to patient: [facility]    Best call back number: [phone]    Patient is needing: CALLING TO CHECK IF OFFICE RECEIVED FAX REGARDING PATIENT ASSISTANCE MISSING SIGNATURE. [facility] ATTEMPTED TO WARM TRANSFER TO OFFICE BUT WAS UNABLE. [facility] SAW THAT PATIENT ASSISTANCE PAPERWORK HAD BEEN FAXED ON THE 30TH AND ADVISED CALLER OF THAT BUT SHE STATED THAT THEY HAD NOT RECEIVED THEM.

## 2025-02-18 NOTE — TELEPHONE ENCOUNTER
Left a message for [facility] pharmacy to call back and ask for [place] to discuss Mr. Rojas.

## 2025-03-10 ENCOUNTER — OFFICE VISIT (OUTPATIENT)
Dept: FAMILY MEDICINE CLINIC | Facility: CLINIC | Age: 70
End: 2025-03-10
Payer: MEDICARE

## 2025-03-10 VITALS
BODY MASS INDEX: 47.89 KG/M2 | HEIGHT: 66 IN | DIASTOLIC BLOOD PRESSURE: 84 MMHG | SYSTOLIC BLOOD PRESSURE: 138 MMHG | WEIGHT: 298 LBS | HEART RATE: 84 BPM

## 2025-03-10 DIAGNOSIS — I25.5 ISCHEMIC CARDIOMYOPATHY: Chronic | ICD-10-CM

## 2025-03-10 DIAGNOSIS — E11.29 TYPE 2 DIABETES MELLITUS WITH DIABETIC MICROALBUMINURIA, WITHOUT LONG-TERM CURRENT USE OF INSULIN: ICD-10-CM

## 2025-03-10 DIAGNOSIS — I50.22 CHRONIC HFREF (HEART FAILURE WITH REDUCED EJECTION FRACTION): Chronic | ICD-10-CM

## 2025-03-10 DIAGNOSIS — M86.9 OSTEOMYELITIS OF RIGHT KNEE REGION: Primary | ICD-10-CM

## 2025-03-10 DIAGNOSIS — E66.01 CLASS 3 SEVERE OBESITY DUE TO EXCESS CALORIES WITH SERIOUS COMORBIDITY AND BODY MASS INDEX (BMI) OF 45.0 TO 49.9 IN ADULT: ICD-10-CM

## 2025-03-10 DIAGNOSIS — I10 ESSENTIAL HYPERTENSION: Chronic | ICD-10-CM

## 2025-03-10 DIAGNOSIS — T84.59XA INFECTION OF TOTAL KNEE REPLACEMENT, INITIAL ENCOUNTER: ICD-10-CM

## 2025-03-10 DIAGNOSIS — Z72.0 TOBACCO USE: ICD-10-CM

## 2025-03-10 DIAGNOSIS — R80.9 TYPE 2 DIABETES MELLITUS WITH DIABETIC MICROALBUMINURIA, WITHOUT LONG-TERM CURRENT USE OF INSULIN: ICD-10-CM

## 2025-03-10 DIAGNOSIS — E53.8 B12 DEFICIENCY: Chronic | ICD-10-CM

## 2025-03-10 DIAGNOSIS — E66.813 CLASS 3 SEVERE OBESITY DUE TO EXCESS CALORIES WITH SERIOUS COMORBIDITY AND BODY MASS INDEX (BMI) OF 45.0 TO 49.9 IN ADULT: ICD-10-CM

## 2025-03-10 DIAGNOSIS — Z96.659 INFECTION OF TOTAL KNEE REPLACEMENT, INITIAL ENCOUNTER: ICD-10-CM

## 2025-03-10 DIAGNOSIS — E78.5 HYPERLIPIDEMIA LDL GOAL <70: Chronic | ICD-10-CM

## 2025-03-10 RX ORDER — ASPIRIN 81 MG/1
81 TABLET ORAL DAILY
Start: 2025-03-10

## 2025-03-10 RX ORDER — RIFAMPIN 300 MG/1
300 CAPSULE ORAL 2 TIMES DAILY
Start: 2025-03-10

## 2025-03-10 RX ORDER — HYDROCODONE BITARTRATE AND ACETAMINOPHEN 10; 325 MG/1; MG/1
TABLET ORAL
COMMUNITY
Start: 2025-03-05 | End: 2025-03-10

## 2025-03-10 RX ORDER — METFORMIN HYDROCHLORIDE 500 MG/1
1000 TABLET, EXTENDED RELEASE ORAL 2 TIMES DAILY WITH MEALS
Qty: 360 TABLET | Refills: 1 | Status: SHIPPED | OUTPATIENT
Start: 2025-03-10

## 2025-03-10 RX ORDER — DIAPER,BRIEF,INFANT-TODD,DISP
EACH MISCELLANEOUS
COMMUNITY
Start: 2025-03-06

## 2025-03-10 RX ORDER — DOXYCYCLINE 100 MG/1
100 CAPSULE ORAL 2 TIMES DAILY
Start: 2025-03-10

## 2025-03-10 NOTE — ASSESSMENT & PLAN NOTE
Discussed importance of smoking cessation to help with his infected right knee prosthetic complications and prevent future infections that he does plan to have a left knee replacement surgery.

## 2025-03-10 NOTE — ASSESSMENT & PLAN NOTE
Hypertension is stable and controlled  Continue current treatment regimen.  Weight loss.  Regular aerobic exercise.  Blood pressure will be reassessed  at next regular follow-up appointment .

## 2025-03-10 NOTE — ASSESSMENT & PLAN NOTE
Doing well with current regimen including low-dose aspirin, Farxiga, Zetia, metoprolol, Crestor, Entresto, spironolactone, and torsemide.    Follow-up ongoing with Dr. Lam.

## 2025-03-10 NOTE — ASSESSMENT & PLAN NOTE
We did review together his hospital admission and discharge summary from his prolonged inpatient stay at [facility].    He was admitted February 12, 2025 through March 7, 2025.    He does continue with physical therapy at home along with walker use for fall prevention.  He is on doxycycline with rifampin and ongoing infectious disease follow-up as well.  They are expecting to continue his antibiotics for a full 10 weeks after discharge following his 6 weeks of IV antibiotics.    A1c down to 6.1 and he continues on metformin alone for diabetes control.    Importance of ongoing smoking cessation discussed in detail today.

## 2025-03-10 NOTE — ASSESSMENT & PLAN NOTE
Patient's (Body mass index is 48.1 kg/m².) indicates that they are morbidly/severely obese (BMI > 40 or > 35 with obesity - related health condition) with health conditions that include hypertension, coronary heart disease, diabetes mellitus, dyslipidemias, and osteoarthritis . Weight is unchanged. BMI  is above average; BMI management plan is completed. We discussed portion control and increasing exercise.

## 2025-03-10 NOTE — PROGRESS NOTES
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     Subjective    History of Present Illness:  Sacha Rojas is a 69 y.o. male who presents today for follow-up visit after unfortunate episode of right knee prosthetic infection requiring hospitalization in February with OR washout and prolonged IV antibiotics.  He was in the rehab facility at [facility] following his discharge from inpatient treatment on February 13 through March 7, 2025.  He was given IV antibiotics through his PICC line the entire time and A1c did come down to 6.1.    He is using his walker and his knee infection has resolved but he does have ongoing follow-up with infectious disease and continues on doxycycline with rifampin for 10 weeks after discharge.  Follow-up with infectious disease is scheduled for later this month he does have orthopedic follow-up as well.    He is considering a left knee replacement but needs to get completely healed from his right knee replacement with complicated infection before they are willing to proceed with any future surgery.    We did discuss the importance of smoking cessation in terms of wound healing and infection risk and he did have 1 or 2 draws of the cigarette this morning because he had to put his dog down but is motivated to stay away from smoking.    No problems with current regimen for hypertension, hyperlipidemia, diabetes, and coronary artery disease.    Will plan to get blood work up-to-date at follow-up first wellness visit in July    Objective   Vital Signs:   /84 (BP Location: Left arm, Patient Position: Sitting, Cuff Size: Large Adult)   Pulse 84   Ht 167.6 cm (66\")   Wt 135 kg (298 lb)   BMI 48.10 kg/m²     Review of Systems   Constitutional:  Negative for appetite change, chills and fever.   HENT:  Negative for " hearing loss.    Eyes:  Negative for blurred vision.   Respiratory:  Negative for chest tightness.    Cardiovascular:  Negative for chest pain.   Gastrointestinal:  Negative for abdominal pain.   Musculoskeletal:  Positive for arthralgias and gait problem.   Skin:  Negative for rash.   Neurological:  Positive for weakness.   Psychiatric/Behavioral:  Positive for stress. Negative for depressed mood.        Past History:  Medical History: has a past medical history of Arthritis, Benign essential hypertension, Body mass index (BMI)40.0-44.9, adult, CAD (coronary artery disease) (2001), Cataract, COPD (chronic obstructive pulmonary disease), Coronary arteriosclerosis in native artery, Hypertension, Impaired fasting blood sugar, Ischemic cardiomyopathy, Mixed hyperlipidemia, Obesity, Tobacco dependence syndrome, Tonsillitis, Type 2 diabetes mellitus without complication, without long-term current use of insulin, and Vitamin D deficiency.   Surgical History: has a past surgical history that includes Tonsillectomy; Adenoidectomy; Other surgical history; Colonoscopy; and Colonoscopy (N/A, 2/7/2023).   Family History: family history includes Coronary artery disease in his father; Hypertension in his father; Peripheral vascular disease in his father; Stomach cancer in his mother.   Social History: reports that he has been smoking cigarettes. He has a 50 pack-year smoking history. He has been exposed to tobacco smoke. He has quit using smokeless tobacco.  His smokeless tobacco use included chew. He reports that he does not drink alcohol and does not use drugs.      Current Outpatient Medications:     aspirin (ASPIR) 81 MG EC tablet, Take 1 tablet by mouth Daily., Disp: , Rfl:     Cholecalciferol (vitamin D3) 125 MCG (5000 UT) tablet tablet, Take 1 tablet by mouth Daily. Take one tablet by oral route daily, Disp: , Rfl:     dapagliflozin Propanediol (Farxiga) 10 MG tablet, Take 10 mg by mouth Daily. TAKE 1 TABLET BY ORAL ROUTE  EVERY DAY IN THE MORNING, Disp: 90 tablet, Rfl: 3    ezetimibe (ZETIA) 10 MG tablet, Take 1 tablet by mouth Daily., Disp: 90 tablet, Rfl: 3    hydrocortisone 1 % ointment, , Disp: , Rfl:     metFORMIN ER (GLUCOPHAGE-XR) 500 MG 24 hr tablet, Take 2 tablets by mouth 2 (Two) Times a Day With Meals., Disp: 360 tablet, Rfl: 1    metoprolol tartrate (LOPRESSOR) 50 MG tablet, Take 1 tablet by mouth 2 (Two) Times a Day., Disp: 180 tablet, Rfl: 3    rosuvastatin (CRESTOR) 40 MG tablet, Take 1 tablet by mouth Daily., Disp: 90 tablet, Rfl: 3    sacubitril-valsartan (Entresto)  MG tablet, Take 1 tablet by mouth 2 (Two) Times a Day., Disp: 180 tablet, Rfl: 3    spironolactone (ALDACTONE) 25 MG tablet, Take 1 tablet by mouth Daily., Disp: 90 tablet, Rfl: 3    torsemide (DEMADEX) 10 MG tablet, Take 1 tablet by mouth Daily., Disp: 90 tablet, Rfl: 3    vitamin B-12 (CYANOCOBALAMIN) 1000 MCG tablet, Take 1 tablet by mouth Daily. Take one tablet by oral route daily, Disp: , Rfl:     doxycycline (VIBRAMYCIN) 100 MG capsule, Take 1 capsule by mouth 2 (Two) Times a Day. (Rx by ID x 10 weeks after d/c 3/7/25), Disp: , Rfl:     rifAMPin (RIFADIN) 300 MG capsule, Take 1 capsule by mouth 2 (Two) Times a Day. For 10 weeks (Rx by ID), Disp: , Rfl:     Allergies: Clopidogrel bisulfate, Clindamycin/lincomycin, and Varenicline tartrate    Physical Exam  Constitutional:       Appearance: He is obese.   HENT:      Head: Normocephalic.      Right Ear: External ear normal.      Left Ear: External ear normal.      Nose: Nose normal.   Eyes:      Pupils: Pupils are equal, round, and reactive to light.   Cardiovascular:      Rate and Rhythm: Normal rate and regular rhythm.      Heart sounds: Normal heart sounds.   Pulmonary:      Effort: Pulmonary effort is normal.      Breath sounds: Normal breath sounds.   Musculoskeletal:      Cervical back: Normal range of motion.      Comments: Right knee swelling improving after prolonged patient admission  and IV antibiotics following his right knee replacement infection.  He was admitted February 12 through March 7, 2025 and continues on oral antibiotics with infectious disease consultation.   Skin:     General: Skin is warm and dry.   Neurological:      General: No focal deficit present.      Mental Status: He is alert.   Psychiatric:      Comments: Tearful given his dog of many years had to be put to sleep today.  His daughter is here for support.          Result Review                   Assessment and Plan  Diagnoses and all orders for this visit:    1. Osteomyelitis of right knee region (Primary)  Assessment & Plan:  We did review together his hospital admission and discharge summary from his prolonged inpatient stay at [facility].    He was admitted February 12, 2025 through March 7, 2025.    He does continue with physical therapy at home along with walker use for fall prevention.  He is on doxycycline with rifampin and ongoing infectious disease follow-up as well.  They are expecting to continue his antibiotics for a full 10 weeks after discharge following his 6 weeks of IV antibiotics.    A1c down to 6.1 and he continues on metformin alone for diabetes control.    Importance of ongoing smoking cessation discussed in detail today.    Orders:  -     doxycycline (VIBRAMYCIN) 100 MG capsule; Take 1 capsule by mouth 2 (Two) Times a Day. (Rx by ID x 10 weeks after d/c 3/7/25)  -     rifAMPin (RIFADIN) 300 MG capsule; Take 1 capsule by mouth 2 (Two) Times a Day. For 10 weeks (Rx by ID)    2. Infection of total knee replacement, initial encounter  Assessment & Plan:  See above    Orders:  -     doxycycline (VIBRAMYCIN) 100 MG capsule; Take 1 capsule by mouth 2 (Two) Times a Day. (Rx by ID x 10 weeks after d/c 3/7/25)  -     rifAMPin (RIFADIN) 300 MG capsule; Take 1 capsule by mouth 2 (Two) Times a Day. For 10 weeks (Rx by ID)    3. Type 2 diabetes mellitus with diabetic microalbuminuria, without  long-term current use of insulin  Assessment & Plan:  Diabetes is improving with treatment.   Continue current treatment regimen.  Recommended an ADA diet.  Regular aerobic exercise.  Diabetes will be reassessed  4 mos    Orders:  -     metFORMIN ER (GLUCOPHAGE-XR) 500 MG 24 hr tablet; Take 2 tablets by mouth 2 (Two) Times a Day With Meals.  Dispense: 360 tablet; Refill: 1  -     aspirin (ASPIR) 81 MG EC tablet; Take 1 tablet by mouth Daily.    4. Essential hypertension  Assessment & Plan:  Hypertension is stable and controlled  Continue current treatment regimen.  Weight loss.  Regular aerobic exercise.  Blood pressure will be reassessed  at next regular follow-up appointment .      5. Hyperlipidemia LDL goal <70  Assessment & Plan:   Lipid abnormalities are stable    Plan:  Continue same medication/s without change.      Discussed medication dosage, use, side effects, and goals of treatment in detail.    Counseled patient on lifestyle modifications to help control hyperlipidemia.     Patient Treatment Goals:   LDL goal is less than 70    Followup at the next regular appointment.      6. Chronic HFrEF (heart failure with reduced ejection fraction)  Assessment & Plan:  Doing well with current regimen.   Continue current regimen from cardiology      7. Ischemic cardiomyopathy  Assessment & Plan:  Doing well with current regimen including low-dose aspirin, Farxiga, Zetia, metoprolol, Crestor, Entresto, spironolactone, and torsemide.    Follow-up ongoing with Dr. Lam.        8. B12 deficiency  Assessment & Plan:  Continue vitamin B12 supplementation.        9. Tobacco use  Assessment & Plan:  Discussed importance of smoking cessation to help with his infected right knee prosthetic complications and prevent future infections that he does plan to have a left knee replacement surgery.      10. Class 3 severe obesity due to excess calories with serious comorbidity and body mass index (BMI) of 45.0 to 49.9 in  adult  Assessment & Plan:  Patient's (Body mass index is 48.1 kg/m².) indicates that they are morbidly/severely obese (BMI > 40 or > 35 with obesity - related health condition) with health conditions that include hypertension, coronary heart disease, diabetes mellitus, dyslipidemias, and osteoarthritis . Weight is unchanged. BMI  is above average; BMI management plan is completed. We discussed portion control and increasing exercise.                      Follow Up  Return in about 4 months (around 7/10/2025) for Medicare Wellness.    Carl Solano MD

## 2025-03-26 ENCOUNTER — OFFICE VISIT (OUTPATIENT)
Dept: CARDIOLOGY | Facility: CLINIC | Age: 70
End: 2025-03-26
Payer: MEDICARE

## 2025-03-26 VITALS
SYSTOLIC BLOOD PRESSURE: 142 MMHG | TEMPERATURE: 98 F | WEIGHT: 281 LBS | HEIGHT: 66 IN | DIASTOLIC BLOOD PRESSURE: 80 MMHG | BODY MASS INDEX: 45.16 KG/M2 | HEART RATE: 78 BPM | OXYGEN SATURATION: 99 % | RESPIRATION RATE: 18 BRPM

## 2025-03-26 DIAGNOSIS — E66.01 CLASS 3 SEVERE OBESITY DUE TO EXCESS CALORIES WITH SERIOUS COMORBIDITY AND BODY MASS INDEX (BMI) OF 45.0 TO 49.9 IN ADULT: ICD-10-CM

## 2025-03-26 DIAGNOSIS — Z72.0 TOBACCO USE: ICD-10-CM

## 2025-03-26 DIAGNOSIS — I10 ESSENTIAL HYPERTENSION: Chronic | ICD-10-CM

## 2025-03-26 DIAGNOSIS — I50.22 CHRONIC HFREF (HEART FAILURE WITH REDUCED EJECTION FRACTION): Primary | Chronic | ICD-10-CM

## 2025-03-26 DIAGNOSIS — I25.5 ISCHEMIC CARDIOMYOPATHY: Chronic | ICD-10-CM

## 2025-03-26 DIAGNOSIS — E66.813 CLASS 3 SEVERE OBESITY DUE TO EXCESS CALORIES WITH SERIOUS COMORBIDITY AND BODY MASS INDEX (BMI) OF 45.0 TO 49.9 IN ADULT: ICD-10-CM

## 2025-03-26 DIAGNOSIS — E78.5 HYPERLIPIDEMIA LDL GOAL <70: Chronic | ICD-10-CM

## 2025-03-26 PROBLEM — Z01.810 PREOP CARDIOVASCULAR EXAM: Status: RESOLVED | Noted: 2024-10-29 | Resolved: 2025-03-26

## 2025-03-26 NOTE — PROGRESS NOTES
[facility]  [facility] CARDIOLOGY  [address] [place] [address]  Dept: [phone]  Dept Fax: [phone]    Sacha Rojas  1955    Follow Up Office Visit Note    History of Present Illness:  Sacha Rojas is a 69 y.o. male who presents to the clinic for Follow-up. Ischemic cardiomyopathy- He seems stable underwent surgery and unfortunately has infection in the right knee and was getting IV antibiotics for long time, his BP here is 80.60, he states also was low during the infection, will d,c Torsemide, will advised to check BP  daily and lt me know if the BP stays low, we might need to lower Entresto    The following portions of the patient's history were reviewed and updated as appropriate: allergies, current medications, past family history, past medical history, past social history, past surgical history, and problem list.    Medications:  aspirin  dapagliflozin Propanediol tablet  doxycycline  Entresto tablet  ezetimibe  hydrocortisone  metFORMIN ER  metoprolol tartrate  rifAMPin  rosuvastatin  spironolactone  vitamin B-12  vitamin D3 tablet    Subjective  Allergies   Allergen Reactions    Clopidogrel Bisulfate Hives    Clindamycin/Lincomycin Rash    Varenicline Tartrate Hives        Past Medical History:   Diagnosis Date    Arthritis     Benign essential hypertension     Body mass index (BMI)40.0-44.9, adult     CAD (coronary artery disease) 2001    100% RCA 95% prox LAD    Cataract     COPD (chronic obstructive pulmonary disease)     Coronary arteriosclerosis in native artery     Hypertension     Impaired fasting blood sugar     Ischemic cardiomyopathy     He seems doing fine, no chest pain, no SOB, no edema, he underwent cardiac cath with Moderate disase 60 to 70 % CX, LAD 60 to 70 % and %; will keep medical treatment Entresto 49.51 bid, Metoprolol 50 mg bid will add Farixga 10 mg daily.    Mixed hyperlipidemia     Obesity     Tobacco dependence  syndrome     Tonsillitis     Type 2 diabetes mellitus without complication, without long-term current use of insulin     Vitamin D deficiency        Past Surgical History:   Procedure Laterality Date    ADENOIDECTOMY      COLONOSCOPY      COLONOSCOPY N/A 2/7/2023    Procedure: COLONOSCOPY;  Surgeon: Estevan Dunn MD;  Location: [facility] ENDOSCOPY;  Service: Gastroenterology;  Laterality: N/A;    OTHER SURGICAL HISTORY      STENT PLACEMENT  2001 and 2005    TONSILLECTOMY         Family History   Problem Relation Age of Onset    Stomach cancer Mother     Coronary artery disease Father     Peripheral vascular disease Father     Hypertension Father         Social History     Socioeconomic History    Marital status:    Tobacco Use    Smoking status: Heavy Smoker     Current packs/day: 1.00     Average packs/day: 1 pack/day for 50.0 years (50.0 ttl pk-yrs)     Types: Cigarettes     Passive exposure: Current    Smokeless tobacco: Former     Types: Chew    Tobacco comments:     Up to 4 packs per day years ago.    Vaping Use    Vaping status: Never Used   Substance and Sexual Activity    Alcohol use: Never    Drug use: Never    Sexual activity: Not Currently       Review of Systems   Constitutional: Negative.    HENT: Negative.     Respiratory: Negative.     Cardiovascular: Negative.    Endocrine: Negative.    Genitourinary: Negative.    Musculoskeletal: Negative.    Skin: Negative.    Allergic/Immunologic: Negative.    Neurological: Negative.    Hematological: Negative.    Psychiatric/Behavioral: Negative.     All other systems reviewed and are negative.    Cardiovascular Procedures    ECHO/MUGA:  STRESS TESTS:   CARDIAC CATH:   DEVICES:   HOLTER:   CT/MRI:   VASCULAR:   CARDIOTHORACIC:     Objective  Vitals:    03/26/25 1259   BP: 142/80   BP Location: Right arm   Patient Position: Lying   Cuff Size: Adult   Pulse: 78   Resp: 18   Temp: 98 °F (36.7 °C)   TempSrc: Infrared   SpO2: 99%   Weight: 127 kg (281 lb)  "  Height: 167.6 cm (66\")   PainSc: 0-No pain     Body mass index is 45.35 kg/m².     Physical Exam  Vitals reviewed.   Constitutional:       Appearance: Healthy appearance. Not in distress.   Eyes:      Pupils: Pupils are equal, round, and reactive to light.   HENT:    Mouth/Throat:      Pharynx: Oropharynx is clear.   Neck:      Thyroid: Thyroid normal.      Vascular: No JVR. JVD normal.   Pulmonary:      Effort: Pulmonary effort is normal.      Breath sounds: Normal breath sounds. No wheezing. No rhonchi. No rales.   Chest:      Chest wall: Not tender to palpatation.   Cardiovascular:      PMI at left midclavicular line. Normal rate. Regular rhythm. Normal S1. Normal S2.       Murmurs: There is no murmur.      No gallop.  No click. No rub.   Pulses:     Intact distal pulses.      Carotid: 3+ bilaterally.     Radial: 3+ bilaterally.     Femoral: 3+ bilaterally.     Dorsalis pedis: 3+ bilaterally.     Posterior tibial: 3+ bilaterally.  Edema:     Peripheral edema absent.   Abdominal:      General: Bowel sounds are normal.      Palpations: Abdomen is soft.      Tenderness: There is no abdominal tenderness.   Musculoskeletal: Normal range of motion.         General: No tenderness.      Cervical back: Normal range of motion and neck supple. Skin:     General: Skin is warm and dry.   Neurological:      General: No focal deficit present.      Mental Status: Alert and oriented to person, place and time.        Diagnostic Data  Procedures    Assessment and Plan  Diagnoses and all orders for this visit:    Chronic HFrEF (heart failure with reduced ejection fraction) his Ef normalized on Entresto, Aldactone, and Metoprolol plus farxiga and Torsemide, as his BP is low, will d,c Torsemide    Class 3 severe obesity due to excess calories with serious comorbidity and body mass index (BMI) of 45.0 to 49.9 in adult    Essential hypertension- BP seems too low 80.60 he has had lab at [facility]    Hyperlipidemia LDL goal " <70o n Crestor 40 mg and  also Zetia     Ischemic cardiomyopathy- no chest pain, never has stent has 1005 RCA and mild disease LAD and CX     Tobacco use- still smoking         Return in about 6 months (around 9/26/2025) for Recheck with Dr. Lam.    Darrion Lam MD  03/26/2025

## 2025-04-24 ENCOUNTER — TELEPHONE (OUTPATIENT)
Dept: CARDIOLOGY | Facility: CLINIC | Age: 70
End: 2025-04-24
Payer: MEDICARE

## 2025-04-25 NOTE — TELEPHONE ENCOUNTER
Spoke with Mr. Rojas and advised he was approved for entresto. Emailed him the approval letter and number to call to start the first shipment.

## 2025-05-28 ENCOUNTER — TELEPHONE (OUTPATIENT)
Dept: FAMILY MEDICINE CLINIC | Facility: CLINIC | Age: 70
End: 2025-05-28

## 2025-05-28 DIAGNOSIS — Z12.2 ENCOUNTER FOR SCREENING FOR LUNG CANCER: ICD-10-CM

## 2025-05-28 DIAGNOSIS — Z87.891 PERSONAL HISTORY OF TOBACCO USE: Primary | ICD-10-CM

## 2025-05-28 NOTE — TELEPHONE ENCOUNTER
Caller: LAKIA WITH  [employer]     Relationship: [unfilled]         What is your medical concern? LAKIA WITH  [facility] CALLED STATED THAT PT HAD LUNG CANCER SCREENING 2/19/2024 PT IS DUE FOR A F/U

## 2025-05-29 NOTE — ANESTHESIA POSTPROCEDURE EVALUATION
Patient: Sacha Rojas    Procedure Summary       Date: 02/07/23 Room / Location:  [facility] /  [facility]    Anesthesia Start: 1044 Anesthesia Stop: 1127    Procedure: COLONOSCOPY Diagnosis:       Screening for colon cancer      (Screening for colon cancer [Z12.11])    Surgeons: Estevan Dunn MD Provider: Sundar Le MD    Anesthesia Type: general ASA Status: 4            Anesthesia Type: general    Vitals  Vitals Value Taken Time   /80 03/26/25 12:59   Temp 98 °F (36.7 °C) 03/26/25 12:59   Pulse 78 03/26/25 12:59   Resp 18 03/26/25 12:59   SpO2 99 % 03/26/25 12:59           Post Anesthesia Care and Evaluation    Patient location during evaluation: PACU  Patient participation: complete - patient participated  Level of consciousness: awake and alert  Pain management: adequate    Airway patency: patent  Anesthetic complications: No anesthetic complications  PONV Status: none  Cardiovascular status: hemodynamically stable and acceptable  Respiratory status: nonlabored ventilation, acceptable and nasal cannula  Hydration status: acceptable

## 2025-07-08 ENCOUNTER — OFFICE VISIT (OUTPATIENT)
Dept: PULMONOLOGY | Facility: CLINIC | Age: 70
End: 2025-07-08
Payer: MEDICARE

## 2025-07-08 ENCOUNTER — PATIENT OUTREACH (OUTPATIENT)
Dept: ONCOLOGY | Facility: CLINIC | Age: 70
End: 2025-07-08
Payer: MEDICARE

## 2025-07-08 VITALS
OXYGEN SATURATION: 94 % | BODY MASS INDEX: 46.28 KG/M2 | DIASTOLIC BLOOD PRESSURE: 68 MMHG | WEIGHT: 288 LBS | HEART RATE: 80 BPM | SYSTOLIC BLOOD PRESSURE: 128 MMHG | HEIGHT: 66 IN | TEMPERATURE: 97.5 F

## 2025-07-08 DIAGNOSIS — R91.1 LUNG NODULE: Primary | ICD-10-CM

## 2025-07-08 DIAGNOSIS — Z72.0 TOBACCO USE: ICD-10-CM

## 2025-07-08 PROCEDURE — 1159F MED LIST DOCD IN RCRD: CPT | Performed by: INTERNAL MEDICINE

## 2025-07-08 PROCEDURE — 3074F SYST BP LT 130 MM HG: CPT | Performed by: INTERNAL MEDICINE

## 2025-07-08 PROCEDURE — 94726 PLETHYSMOGRAPHY LUNG VOLUMES: CPT | Performed by: INTERNAL MEDICINE

## 2025-07-08 PROCEDURE — 99204 OFFICE O/P NEW MOD 45 MIN: CPT | Performed by: INTERNAL MEDICINE

## 2025-07-08 PROCEDURE — 94375 RESPIRATORY FLOW VOLUME LOOP: CPT | Performed by: INTERNAL MEDICINE

## 2025-07-08 PROCEDURE — 94729 DIFFUSING CAPACITY: CPT | Performed by: INTERNAL MEDICINE

## 2025-07-08 PROCEDURE — 3078F DIAST BP <80 MM HG: CPT | Performed by: INTERNAL MEDICINE

## 2025-07-08 PROCEDURE — 1160F RVW MEDS BY RX/DR IN RCRD: CPT | Performed by: INTERNAL MEDICINE

## 2025-07-08 NOTE — PROGRESS NOTES
New Patient Pulmonary Office Visit      Patient Name: Sacha Rojas    Referring Physician: Carl Solano,*    Chief Complaint:    Chief Complaint   Patient presents with    Abnormal Chest CT       History of Present Illness: Sacha Rojas is a 70 y.o. male who is here today to establish care with Pulmonary.  Patient has a past medical history significant for hypertension, ischemic cardiomyopathy, systolic heart failure, diabetes mellitus type 2, and tobacco abuse.  Patient was referred to pulmonary for evaluation of a pulmonary nodule.  Patient currently doing well denies any chest pain, nausea, fever, or chills.  Was doing his lung cancer screening this already had a right lower lobe nodule.  No other acute complaints.    Review of Systems:   Review of Systems   Constitutional:  Negative for chills, fatigue and fever.   HENT:  Negative for congestion and voice change.    Eyes:  Negative for blurred vision.   Respiratory:  Negative for cough, shortness of breath and wheezing.    Cardiovascular:  Negative for chest pain.   Skin:  Negative for dry skin.   Hematological:  Negative for adenopathy.   Psychiatric/Behavioral:  Negative for agitation and depressed mood.        Past Medical History:   Past Medical History:   Diagnosis Date    Arthritis     Benign essential hypertension     Body mass index (BMI)40.0-44.9, adult     CAD (coronary artery disease) 2001    100% RCA 95% prox LAD    Cataract     COPD (chronic obstructive pulmonary disease)     Coronary arteriosclerosis in native artery     Hypertension     Impaired fasting blood sugar     Ischemic cardiomyopathy     He seems doing fine, no chest pain, no SOB, no edema, he underwent cardiac cath with Moderate disase 60 to 70 % CX, LAD 60 to 70 % and %; will keep medical treatment Entresto 49.51 bid, Metoprolol 50 mg bid will add Farixga 10 mg daily.    Mixed hyperlipidemia     Obesity     Tobacco dependence syndrome     Tonsillitis      Type 2 diabetes mellitus without complication, without long-term current use of insulin     Vitamin D deficiency        Past Surgical History:   Past Surgical History:   Procedure Laterality Date    ADENOIDECTOMY      COLONOSCOPY      COLONOSCOPY N/A 2/7/2023    Procedure: COLONOSCOPY;  Surgeon: Estevan Dunn MD;  Location: [facility];  Service: Gastroenterology;  Laterality: N/A;    OTHER SURGICAL HISTORY      STENT PLACEMENT  2001 and 2005    TONSILLECTOMY         Family History:   Family History   Problem Relation Age of Onset    Stomach cancer Mother     Coronary artery disease Father     Peripheral vascular disease Father     Hypertension Father        Social History:   Social History     Socioeconomic History    Marital status:    Tobacco Use    Smoking status: Heavy Smoker     Current packs/day: 1.00     Average packs/day: 1 pack/day for 50.0 years (50.0 ttl pk-yrs)     Types: Cigarettes     Passive exposure: Current    Smokeless tobacco: Former     Types: Chew    Tobacco comments:     Up to 4 packs per day years ago.    Vaping Use    Vaping status: Never Used   Substance and Sexual Activity    Alcohol use: Never    Drug use: Never    Sexual activity: Not Currently       Medications:     Current Outpatient Medications:     aspirin (ASPIR) 81 MG EC tablet, Take 1 tablet by mouth Daily., Disp: , Rfl:     Cholecalciferol (vitamin D3) 125 MCG (5000 UT) tablet tablet, Take 1 tablet by mouth Daily. Take one tablet by oral route daily, Disp: , Rfl:     dapagliflozin Propanediol (Farxiga) 10 MG tablet, Take 10 mg by mouth Daily. TAKE 1 TABLET BY ORAL ROUTE EVERY DAY IN THE MORNING, Disp: 90 tablet, Rfl: 3    doxycycline (VIBRAMYCIN) 100 MG capsule, Take 1 capsule by mouth 2 (Two) Times a Day. (Rx by ID x 10 weeks after d/c 3/7/25), Disp: , Rfl:     ezetimibe (ZETIA) 10 MG tablet, Take 1 tablet by mouth Daily., Disp: 90 tablet, Rfl: 3    hydrocortisone 1 % ointment, , Disp: , Rfl:     metFORMIN ER  "(GLUCOPHAGE-XR) 500 MG 24 hr tablet, Take 2 tablets by mouth 2 (Two) Times a Day With Meals., Disp: 360 tablet, Rfl: 1    metoprolol tartrate (LOPRESSOR) 50 MG tablet, Take 1 tablet by mouth 2 (Two) Times a Day., Disp: 180 tablet, Rfl: 3    rifAMPin (RIFADIN) 300 MG capsule, Take 1 capsule by mouth 2 (Two) Times a Day. For 10 weeks (Rx by ID), Disp: , Rfl:     rosuvastatin (CRESTOR) 40 MG tablet, Take 1 tablet by mouth Daily., Disp: 90 tablet, Rfl: 3    sacubitril-valsartan (Entresto)  MG tablet, Take 1 tablet by mouth 2 (Two) Times a Day., Disp: 180 tablet, Rfl: 3    spironolactone (ALDACTONE) 25 MG tablet, Take 1 tablet by mouth Daily., Disp: 90 tablet, Rfl: 3    vitamin B-12 (CYANOCOBALAMIN) 1000 MCG tablet, Take 1 tablet by mouth Daily. Take one tablet by oral route daily, Disp: , Rfl:     Allergies:   Allergies   Allergen Reactions    Clopidogrel Bisulfate Hives    Clindamycin/Lincomycin Rash    Varenicline Tartrate Hives       Physical Exam:  Vital Signs:   Vitals:    07/08/25 1242   BP: 128/68   BP Location: Left arm   Patient Position: Sitting   Cuff Size: Adult   Pulse: 80   Temp: 97.5 °F (36.4 °C)   SpO2: 94%  Comment: room air   Weight: 131 kg (288 lb)   Height: 167.6 cm (65.98\")       Physical Exam  Vitals and nursing note reviewed.   Constitutional:       General: He is not in acute distress.     Appearance: He is well-developed and normal weight. He is not ill-appearing or toxic-appearing.   Cardiovascular:      Rate and Rhythm: Normal rate and regular rhythm.      Pulses: Normal pulses.      Heart sounds: Normal heart sounds. No murmur heard.     No gallop.   Pulmonary:      Effort: Pulmonary effort is normal.      Breath sounds: Normal breath sounds. No wheezing, rhonchi or rales.   Musculoskeletal:      Right lower leg: No edema.      Left lower leg: No edema.   Neurological:      Mental Status: He is alert.         Immunization History   Administered Date(s) Administered    Fluzone High-Dose " 65+YRS 10/29/2024    Fluzone High-Dose 65+yrs 11/01/2022, 02/13/2024    Hepatitis A 11/05/2018, 06/25/2019    Pneumococcal Conjugate 20-Valent (PCV20) 02/13/2024       Results Review:   - I personally reviewed the pts imaging from CT scan from June 2025 compared to February 2024, there was a new 9 mm nodule in the superior segment of the right lower lobe.  - I personally reviewed the pts PFT from 7/8/2025 showed moderate obstruction with mild restriction, significant air trapping and a normal DLCO  - I personally reviewed the pts chart with regards to evaluation by the patient's PCP    Assessment / Plan:   Diagnoses and all orders for this visit:    1. Lung nodule (Primary)  2. Tobacco use  - With regards to the lung nodule.  It is roughly 9 mm and new.  There is a high risk of malignancy, at this time though we will just have close follow-up I will plan for repeat CT scan in roughly 2 months with robotic protocol.  We did discuss bronchoscopy on today's visit and how we may biopsy the lesion if needed.  He verbalized understanding agree with the plan.  I will see him back in 2 months and I do recommend complete tobacco cessation.      Follow Up:   Return in about 2 months (around 9/8/2025).     СЕРГЕЙ Green, DO  Pulmonary and Critical Care Medicine  Note Electronically Signed    Part of this note may be an electronic transcription/translation of spoken language to printed text using the Dragon Dictation System.

## 2025-07-09 DIAGNOSIS — E78.5 HYPERLIPIDEMIA LDL GOAL <70: Chronic | ICD-10-CM

## 2025-07-09 RX ORDER — ROSUVASTATIN CALCIUM 40 MG/1
40 TABLET, COATED ORAL DAILY
Qty: 90 TABLET | Refills: 1 | Status: SHIPPED | OUTPATIENT
Start: 2025-07-09

## 2025-07-10 ENCOUNTER — OFFICE VISIT (OUTPATIENT)
Dept: FAMILY MEDICINE CLINIC | Facility: CLINIC | Age: 70
End: 2025-07-10
Payer: MEDICARE

## 2025-07-10 VITALS
SYSTOLIC BLOOD PRESSURE: 100 MMHG | HEART RATE: 52 BPM | DIASTOLIC BLOOD PRESSURE: 52 MMHG | OXYGEN SATURATION: 96 % | BODY MASS INDEX: 47.98 KG/M2 | WEIGHT: 288 LBS | HEIGHT: 65 IN

## 2025-07-10 DIAGNOSIS — E78.5 HYPERLIPIDEMIA LDL GOAL <70: ICD-10-CM

## 2025-07-10 DIAGNOSIS — I50.22 CHRONIC HFREF (HEART FAILURE WITH REDUCED EJECTION FRACTION): ICD-10-CM

## 2025-07-10 DIAGNOSIS — Z12.5 PROSTATE CANCER SCREENING: ICD-10-CM

## 2025-07-10 DIAGNOSIS — T84.59XA INFECTION OF TOTAL KNEE REPLACEMENT, INITIAL ENCOUNTER: ICD-10-CM

## 2025-07-10 DIAGNOSIS — E53.8 B12 DEFICIENCY: ICD-10-CM

## 2025-07-10 DIAGNOSIS — I25.5 ISCHEMIC CARDIOMYOPATHY: ICD-10-CM

## 2025-07-10 DIAGNOSIS — E11.29 TYPE 2 DIABETES MELLITUS WITH DIABETIC MICROALBUMINURIA, WITHOUT LONG-TERM CURRENT USE OF INSULIN: ICD-10-CM

## 2025-07-10 DIAGNOSIS — Z72.0 TOBACCO USE: ICD-10-CM

## 2025-07-10 DIAGNOSIS — M86.9 OSTEOMYELITIS OF RIGHT KNEE REGION: ICD-10-CM

## 2025-07-10 DIAGNOSIS — Z96.659 INFECTION OF TOTAL KNEE REPLACEMENT, INITIAL ENCOUNTER: ICD-10-CM

## 2025-07-10 DIAGNOSIS — Z00.00 GENERAL MEDICAL EXAM: Primary | ICD-10-CM

## 2025-07-10 DIAGNOSIS — E66.813 CLASS 3 SEVERE OBESITY DUE TO EXCESS CALORIES WITH SERIOUS COMORBIDITY AND BODY MASS INDEX (BMI) OF 45.0 TO 49.9 IN ADULT: ICD-10-CM

## 2025-07-10 DIAGNOSIS — I10 ESSENTIAL HYPERTENSION: ICD-10-CM

## 2025-07-10 DIAGNOSIS — R91.1 NODULE OF LOWER LOBE OF RIGHT LUNG: ICD-10-CM

## 2025-07-10 DIAGNOSIS — R80.9 TYPE 2 DIABETES MELLITUS WITH DIABETIC MICROALBUMINURIA, WITHOUT LONG-TERM CURRENT USE OF INSULIN: ICD-10-CM

## 2025-07-10 LAB
EXPIRATION DATE: ABNORMAL
EXPIRATION DATE: ABNORMAL
HBA1C MFR BLD: 6.1 % (ref 4.5–5.7)
Lab: ABNORMAL
Lab: ABNORMAL
POC ALBUMIN, URINE: 150 MG/L
POC CREATININE, URINE: 100 MG/DL
POC URINE ALB/CREA RATIO: >300

## 2025-07-10 RX ORDER — METFORMIN HYDROCHLORIDE 500 MG/1
1000 TABLET, EXTENDED RELEASE ORAL 2 TIMES DAILY WITH MEALS
Qty: 360 TABLET | Refills: 1 | Status: SHIPPED | OUTPATIENT
Start: 2025-07-10

## 2025-07-10 RX ORDER — ASPIRIN 81 MG/1
81 TABLET ORAL DAILY
Start: 2025-07-10

## 2025-07-10 NOTE — ASSESSMENT & PLAN NOTE
Hypertension is stable and controlled  Continue current treatment regimen.  Weight loss.  Regular aerobic exercise.  Blood pressure will be reassessed at next regular follow-up appointment.

## 2025-07-10 NOTE — ASSESSMENT & PLAN NOTE
Patient's (Body mass index is 47.93 kg/m².) indicates that they are morbidly/severely obese (BMI > 40 or > 35 with obesity - related health condition) with health conditions that include hypertension, coronary heart disease, diabetes mellitus, dyslipidemias, and osteoarthritis . Weight is unchanged. BMI  is above average; BMI management plan is completed. We discussed portion control and increasing exercise.

## 2025-07-10 NOTE — PROGRESS NOTES
Subjective   The ABCs of the Annual Wellness Visit  Medicare Wellness Visit      Sacha Rojas is a 70 y.o. patient who presents for a Medicare Wellness Visit.    The following portions of the patient's history were reviewed and   updated as appropriate: allergies, current medications, past family history, past medical history, past social history, past surgical history, and problem list.    Compared to one year ago, the patient's physical   health is the same.  Compared to one year ago, the patient's mental   health is the same.    Recent Hospitalizations:  He was admitted within the past 365 days at [facility] - Sept 2024 for knee replacement, return admission due to infected joint March 2025.     Current Medical Providers:  Patient Care Team:  Carl Solano MD as PCP - General (Family Medicine)  Darrion Lam MD as Consulting Physician (Cardiology)  Arthur Green DO as Consulting Physician (Pulmonary Disease)  Jimena Mann, RN as Nurse Navigator  Julio Tompkins MD as Consulting Physician (Orthopedic Surgery)  Salvador Nicolas II, MD (Internal Medicine)    Outpatient Medications Prior to Visit   Medication Sig Dispense Refill    Cholecalciferol (vitamin D3) 125 MCG (5000 UT) tablet tablet Take 1 tablet by mouth Daily. Take one tablet by oral route daily      dapagliflozin Propanediol (Farxiga) 10 MG tablet Take 10 mg by mouth Daily. TAKE 1 TABLET BY ORAL ROUTE EVERY DAY IN THE MORNING 90 tablet 3    ezetimibe (ZETIA) 10 MG tablet Take 1 tablet by mouth Daily. 90 tablet 3    hydrocortisone 1 % ointment       metoprolol tartrate (LOPRESSOR) 50 MG tablet Take 1 tablet by mouth 2 (Two) Times a Day. 180 tablet 3    rifAMPin (RIFADIN) 300 MG capsule Take 1 capsule by mouth 2 (Two) Times a Day. For 10 weeks (Rx by ID)      rosuvastatin (CRESTOR) 40 MG tablet TAKE 1 TABLET BY MOUTH DAILY 90 tablet 1    sacubitril-valsartan (Entresto)  MG tablet Take 1 tablet by  mouth 2 (Two) Times a Day. 180 tablet 3    spironolactone (ALDACTONE) 25 MG tablet Take 1 tablet by mouth Daily. 90 tablet 3    vitamin B-12 (CYANOCOBALAMIN) 1000 MCG tablet Take 1 tablet by mouth Daily. Take one tablet by oral route daily      aspirin (ASPIR) 81 MG EC tablet Take 1 tablet by mouth Daily.      metFORMIN ER (GLUCOPHAGE-XR) 500 MG 24 hr tablet Take 2 tablets by mouth 2 (Two) Times a Day With Meals. 360 tablet 1    doxycycline (VIBRAMYCIN) 100 MG capsule Take 1 capsule by mouth 2 (Two) Times a Day. (Rx by ID x 10 weeks after d/c 3/7/25) (Patient not taking: Reported on 7/10/2025)       No facility-administered medications prior to visit.     No opioid medication identified on active medication list. I have reviewed chart for other potential  high risk medication/s and harmful drug interactions in the elderly.      Aspirin is on active medication list. Aspirin use is indicated based on review of current medical condition/s. Pros and cons of this therapy have been discussed today. Benefits of this medication outweigh potential harm.  Patient has been encouraged to continue taking this medication.  .      Patient Active Problem List   Diagnosis    Ischemic cardiomyopathy    Essential hypertension    Type 2 diabetes mellitus, without long-term current use of insulin    Tobacco use    Hyperlipidemia LDL goal <70    Chronic HFrEF (heart failure with reduced ejection fraction)    B12 deficiency    Screening for colon cancer    General medical exam    Prostate cancer screening    Encounter for screening for lung cancer    Class 3 severe obesity due to excess calories with serious comorbidity and body mass index (BMI) of 45.0 to 49.9 in adult    Chronic pain of right knee    Osteomyelitis of right knee region    Infection of total knee replacement    Nodule of lower lobe of right lung     Advance Care Planning Advance Directive is not on file.  ACP discussion was held with the patient during this visit. Patient  "does not have an advance directive, information provided.            Objective   Vitals:    07/10/25 1121   BP: 100/52   BP Location: Left arm   Patient Position: Sitting   Cuff Size: Large Adult   Pulse: 52   SpO2: 96%   Weight: 131 kg (288 lb)   Height: 165.1 cm (65\")   PainSc: 0-No pain       Estimated body mass index is 47.93 kg/m² as calculated from the following:    Height as of this encounter: 165.1 cm (65\").    Weight as of this encounter: 131 kg (288 lb).                Does the patient have evidence of cognitive impairment? No  Lab Results   Component Value Date    HGBA1C 6.1 (A) 07/10/2025                                                                                                Health  Risk Assessment    Smoking Status:  Social History     Tobacco Use   Smoking Status Heavy Smoker    Current packs/day: 1.00    Average packs/day: 1 pack/day for 51.5 years (51.5 ttl pk-yrs)    Types: Cigarettes    Start date:     Passive exposure: Current   Smokeless Tobacco Former    Types: Chew   Tobacco Comments    Up to 4 packs per day years ago.      Alcohol Consumption:  Social History     Substance and Sexual Activity   Alcohol Use Never       Fall Risk Screen  STEADI Fall Risk Assessment was completed, and patient is at MODERATE risk for falls. Assessment completed on:7/10/2025    Depression Screening   Little interest or pleasure in doing things? Not at all   Feeling down, depressed, or hopeless? Not at all   PHQ-2 Total Score 0      Health Habits and Functional and Cognitive Screenin/10/2025    12:00 PM   Functional & Cognitive Status   Do you have difficulty preparing food and eating? No   Do you have difficulty bathing yourself, getting dressed or grooming yourself? No   Do you have difficulty using the toilet? No   Do you have difficulty moving around from place to place? No   Do you have trouble with steps or getting out of a bed or a chair? No   Current Diet Well Balanced Diet   Dental Exam " Not up to date   Eye Exam Not up to date   Exercise (times per week) 0 times per week   Current Exercises Include No Regular Exercise   Do you need help using the phone?  No   Are you deaf or do you have serious difficulty hearing?  No   Do you need help to go to places out of walking distance? No   Do you need help shopping? No   Do you need help preparing meals?  No   Do you need help with housework?  No   Do you need help with laundry? No   Do you need help taking your medications? No   Do you need help managing money? No   Do you ever drive or ride in a car without wearing a seat belt? No   Have you felt unusual fatigue (could be tiredness), stress, anger or loneliness in the last month? No   Who do you live with? Spouse   If you need help, do you have trouble finding someone available to you? No   Have you been bothered in the last four weeks by sexual problems? No   Do you have difficulty concentrating, remembering or making decisions? No           Age-appropriate Screening Schedule:  Refer to the list below for future screening recommendations based on patient's age, sex and/or medical conditions. Orders for these recommended tests are listed in the plan section. The patient has been provided with a written plan.    Health Maintenance List  Health Maintenance   Topic Date Due    DIABETIC EYE EXAM  12/15/2024    LIPID PANEL  06/06/2025    INFLUENZA VACCINE  10/01/2025    HEMOGLOBIN A1C  01/10/2026    COLORECTAL CANCER SCREENING  02/07/2026    ANNUAL WELLNESS VISIT  07/10/2026    DIABETIC FOOT EXAM  07/10/2026    URINE MICROALBUMIN-CREATININE RATIO (uACR)  07/10/2026    HEPATITIS C SCREENING  Completed    Pneumococcal Vaccine 50+  Completed    AAA SCREEN ONCE  Completed    COVID-19 Vaccine  Discontinued    TDAP/TD VACCINES  Discontinued    ZOSTER VACCINE  Discontinued    LUNG CANCER SCREENING  Discontinued                                                                                                                                                 CMS Preventative Services Quick Reference  Risk Factors Identified During Encounter  Fall Risk-High or Moderate: Discussed Fall Prevention in the home    The above risks/problems have been discussed with the patient.  Pertinent information has been shared with the patient in the After Visit Summary.  An After Visit Summary and PPPS were made available to the patient.    Follow Up:   Next Medicare Wellness visit to be scheduled in 1 year.         Additional E&M Note during same encounter follows:  Patient has additional, significant, and separately identifiable condition(s)/problem(s) that require work above and beyond the Medicare Wellness Visit     Chief Complaint  Diabetes, hypertension, cardiomyopathy, B12 deficiency, new 9mm RLL nodule on low-dose lung CT, s/p R TKA in 2024 with postop infection - ID and Ortho still following    Subjective   HPI  Sacha is also being seen today for additional medical problem/s.    New 9mm lung nodule on low-dose lung CT 6/2025, R TKA complicated by postop infection, Diabetes, hypertension, cardiomyopathy, B12 deficiency    Subjective    History of Present Illness:  Sacha Rojas is a 70 y.o. male who presents today for follow-up regarding diabetes, hypertension, coronary artery disease, new 9mm nodule on low-dose lung CT, and R TKA 9/2024 with subsequent postop infection and hospitalization 3/2025.      Doing well with ongoing cardiology follow-up related to his coronary artery disease, chronic congestive heart failure, hyperlipidemia, and hypertension.    Good checkup with ortho and ID - continues rifampin but is off Doxy.       A1c remains good at 6.1 on current regimen    Low-dose lung CT up-to-date 6/10/25 with new 9mm nodule.  Pulm  consultation with Dr Green and plans for close followup CT imaging and also PFTs    Smoking cessation advised - pt declines    Will return for labs and would like PSA screening      Review of Systems  "  Constitutional:  Negative for activity change, appetite change, chills, fatigue and fever.   HENT:  Negative for ear pain, hearing loss and trouble swallowing.    Eyes:  Negative for pain and visual disturbance.   Respiratory:  Negative for cough, chest tightness, shortness of breath and wheezing.    Cardiovascular:  Negative for chest pain, palpitations and leg swelling.   Gastrointestinal:  Negative for abdominal pain and blood in stool.   Genitourinary:  Negative for difficulty urinating.   Musculoskeletal:  Positive for arthralgias. Negative for back pain and joint swelling.   Skin:  Negative for rash.   Neurological:  Negative for dizziness, weakness and light-headedness.   Psychiatric/Behavioral:  Negative for agitation, behavioral problems, dysphoric mood and sleep disturbance.               Objective   Vital Signs:  /52 (BP Location: Left arm, Patient Position: Sitting, Cuff Size: Large Adult)   Pulse 52   Ht 165.1 cm (65\")   Wt 131 kg (288 lb)   SpO2 96%   BMI 47.93 kg/m²     Physical Exam  Constitutional:       Appearance: He is obese.   HENT:      Head: Normocephalic.      Right Ear: External ear normal.      Left Ear: External ear normal.      Nose: Nose normal.   Eyes:      Pupils: Pupils are equal, round, and reactive to light.   Cardiovascular:      Rate and Rhythm: Normal rate and regular rhythm.      Heart sounds: Normal heart sounds.   Pulmonary:      Effort: Pulmonary effort is normal.      Breath sounds: Normal breath sounds.   Musculoskeletal:         General: Normal range of motion.      Cervical back: Normal range of motion.      Right foot: Normal range of motion. No deformity, bunion or foot drop.      Left foot: Normal range of motion. No deformity, bunion or foot drop.   Feet:      Right foot:      Protective Sensation: 10 sites tested.  10 sites sensed.      Skin integrity: Skin integrity normal.      Toenail Condition: Right toenails are normal.      Left foot:      " Protective Sensation: 10 sites tested.  10 sites sensed.      Skin integrity: Skin integrity normal.      Toenail Condition: Left toenails are normal.      Comments: Diabetic Foot Exam Performed and Monofilament Test Performed     Skin:     General: Skin is warm and dry.   Neurological:      General: No focal deficit present.      Mental Status: He is alert.   Psychiatric:         Mood and Affect: Mood normal.         Behavior: Behavior normal.         Thought Content: Thought content normal.                    Assessment and Plan     Diagnoses and all orders for this visit:    1. General medical exam (Primary)  Assessment & Plan:  Discussed together health maintenance and screening along with vaccination options and healthy diet and exercise habits as part of the preventative counseling at their physical exam today.     Orders:  -     CBC & Differential  -     Comprehensive Metabolic Panel  -     Lipid Panel  -     TSH  -     T4, Free    2. Nodule of lower lobe of right lung  Assessment & Plan:  Appreciate pulm consultation and plan - has followup scan planned      3. Infection of total knee replacement, initial encounter  Assessment & Plan:  See above      4. Osteomyelitis of right knee region  Assessment & Plan:  Doing well with ongoing Ortho and ID followup    Now only on Rifampin - updated med list      5. Type 2 diabetes mellitus with diabetic microalbuminuria, without long-term current use of insulin  Assessment & Plan:  Diabetes is improving with treatment.   Continue current treatment regimen.  Recommended an ADA diet.  Regular aerobic exercise.  Diabetes will be reassessed 4 mos    Orders:  -     POCT glycated hemoglobin, total  -     POC Albumin/Creatinine Ratio Urine  -     CBC & Differential  -     Comprehensive Metabolic Panel  -     Lipid Panel  -     TSH  -     T4, Free  -     Vitamin B12  -     aspirin (ASPIR) 81 MG EC tablet; Take 1 tablet by mouth Daily.  -     metFORMIN ER (GLUCOPHAGE-XR) 500 MG  24 hr tablet; Take 2 tablets by mouth 2 (Two) Times a Day With Meals.  Dispense: 360 tablet; Refill: 1    6. Essential hypertension  Assessment & Plan:  Hypertension is stable and controlled  Continue current treatment regimen.  Weight loss.  Regular aerobic exercise.  Blood pressure will be reassessed at next regular follow-up appointment.    Orders:  -     CBC & Differential  -     Comprehensive Metabolic Panel  -     Lipid Panel  -     TSH  -     T4, Free    7. Hyperlipidemia LDL goal <70  Assessment & Plan:   Lipid abnormalities are stable    Plan:  Continue same medication/s without change.      Discussed medication dosage, use, side effects, and goals of treatment in detail.    Counseled patient on lifestyle modifications to help control hyperlipidemia.     Patient Treatment Goals:   LDL goal is less than 70    Followup at the next regular appointment.    Orders:  -     CBC & Differential  -     Comprehensive Metabolic Panel  -     Lipid Panel  -     TSH  -     T4, Free    8. Chronic HFrEF (heart failure with reduced ejection fraction)  Assessment & Plan:  Doing well with current regimen.   Continue current regimen from cardiology      9. Ischemic cardiomyopathy  Assessment & Plan:  Doing well with current regimen including low-dose aspirin, Farxiga, Zetia, metoprolol, Crestor, Entresto, spironolactone, and torsemide.    Follow-up ongoing with Dr. Lam.        10. B12 deficiency  -     Vitamin B12    11. Tobacco use  Assessment & Plan:  Discussed importance of smoking cessation - he voiced understanding but declines      12. Prostate cancer screening  Assessment & Plan:  Awaiting screening psa with fasting labs    Orders:  -     PSA Screen    13. Class 3 severe obesity due to excess calories with serious comorbidity and body mass index (BMI) of 45.0 to 49.9 in adult  Assessment & Plan:  Patient's (Body mass index is 47.93 kg/m².) indicates that they are morbidly/severely obese (BMI > 40 or > 35 with obesity  - related health condition) with health conditions that include hypertension, coronary heart disease, diabetes mellitus, dyslipidemias, and osteoarthritis . Weight is unchanged. BMI  is above average; BMI management plan is completed. We discussed portion control and increasing exercise.                 Follow Up   Return in about 4 months (around 11/10/2025) for Med recheck.  Patient was given instructions and counseling regarding his condition or for health maintenance advice. Please see specific information pulled into the AVS if appropriate.

## 2025-07-12 LAB
ALBUMIN SERPL-MCNC: 4 G/DL (ref 3.5–5.2)
ALBUMIN/GLOB SERPL: 1.5 G/DL
ALP SERPL-CCNC: 94 U/L (ref 39–117)
ALT SERPL-CCNC: 38 U/L (ref 1–41)
AST SERPL-CCNC: 32 U/L (ref 1–40)
BASOPHILS # BLD AUTO: 0.03 10*3/MM3 (ref 0–0.2)
BASOPHILS NFR BLD AUTO: 0.4 % (ref 0–1.5)
BILIRUB SERPL-MCNC: 0.4 MG/DL (ref 0–1.2)
BUN SERPL-MCNC: 22 MG/DL (ref 8–23)
BUN/CREAT SERPL: 17.7 (ref 7–25)
CALCIUM SERPL-MCNC: 9.4 MG/DL (ref 8.6–10.5)
CHLORIDE SERPL-SCNC: 99 MMOL/L (ref 98–107)
CHOLEST SERPL-MCNC: 104 MG/DL (ref 0–200)
CO2 SERPL-SCNC: 25.1 MMOL/L (ref 22–29)
CREAT SERPL-MCNC: 1.24 MG/DL (ref 0.76–1.27)
EGFRCR SERPLBLD CKD-EPI 2021: 62.9 ML/MIN/1.73
EOSINOPHIL # BLD AUTO: 0.2 10*3/MM3 (ref 0–0.4)
EOSINOPHIL NFR BLD AUTO: 2.5 % (ref 0.3–6.2)
ERYTHROCYTE [DISTWIDTH] IN BLOOD BY AUTOMATED COUNT: 18.5 % (ref 12.3–15.4)
GLOBULIN SER CALC-MCNC: 2.7 GM/DL
GLUCOSE SERPL-MCNC: 95 MG/DL (ref 65–99)
HCT VFR BLD AUTO: 45.4 % (ref 37.5–51)
HDLC SERPL-MCNC: 39 MG/DL (ref 40–60)
HGB BLD-MCNC: 14.1 G/DL (ref 13–17.7)
IMM GRANULOCYTES # BLD AUTO: 0.02 10*3/MM3 (ref 0–0.05)
IMM GRANULOCYTES NFR BLD AUTO: 0.2 % (ref 0–0.5)
LDLC SERPL CALC-MCNC: 50 MG/DL (ref 0–100)
LYMPHOCYTES # BLD AUTO: 2.4 10*3/MM3 (ref 0.7–3.1)
LYMPHOCYTES NFR BLD AUTO: 29.7 % (ref 19.6–45.3)
MCH RBC QN AUTO: 25.5 PG (ref 26.6–33)
MCHC RBC AUTO-ENTMCNC: 31.1 G/DL (ref 31.5–35.7)
MCV RBC AUTO: 82.1 FL (ref 79–97)
MONOCYTES # BLD AUTO: 0.57 10*3/MM3 (ref 0.1–0.9)
MONOCYTES NFR BLD AUTO: 7 % (ref 5–12)
NEUTROPHILS # BLD AUTO: 4.87 10*3/MM3 (ref 1.7–7)
NEUTROPHILS NFR BLD AUTO: 60.2 % (ref 42.7–76)
NRBC BLD AUTO-RTO: 0 /100 WBC (ref 0–0.2)
PLATELET # BLD AUTO: 181 10*3/MM3 (ref 140–450)
POTASSIUM SERPL-SCNC: 4.7 MMOL/L (ref 3.5–5.2)
PROT SERPL-MCNC: 6.7 G/DL (ref 6–8.5)
PSA SERPL-MCNC: 0.45 NG/ML (ref 0–4)
RBC # BLD AUTO: 5.53 10*6/MM3 (ref 4.14–5.8)
SODIUM SERPL-SCNC: 136 MMOL/L (ref 136–145)
T4 FREE SERPL-MCNC: 1.08 NG/DL (ref 0.92–1.68)
TRIGL SERPL-MCNC: 72 MG/DL (ref 0–150)
TSH SERPL DL<=0.005 MIU/L-ACNC: 2.53 UIU/ML (ref 0.27–4.2)
VIT B12 SERPL-MCNC: 745 PG/ML (ref 211–946)
VLDLC SERPL CALC-MCNC: 15 MG/DL (ref 5–40)
WBC # BLD AUTO: 8.09 10*3/MM3 (ref 3.4–10.8)

## 2025-07-28 ENCOUNTER — TELEPHONE (OUTPATIENT)
Dept: FAMILY MEDICINE CLINIC | Facility: CLINIC | Age: 70
End: 2025-07-28
Payer: MEDICARE

## 2025-07-28 RX ORDER — EZETIMIBE 10 MG/1
10 TABLET ORAL DAILY
Qty: 90 TABLET | Refills: 1 | Status: SHIPPED | OUTPATIENT
Start: 2025-07-28

## 2025-07-28 RX ORDER — SPIRONOLACTONE 25 MG/1
25 TABLET ORAL DAILY
Qty: 90 TABLET | Refills: 1 | Status: SHIPPED | OUTPATIENT
Start: 2025-07-28

## 2025-07-28 NOTE — TELEPHONE ENCOUNTER
Caller: Sacha Rojas    Relationship: Self    Best call back number: [phone]    What is the best time to reach you: ANY    Who are you requesting to speak with (clinical staff, provider,  specific staff member):     CLINICAL    What was the call regarding:     DID WE GET HIS EYE DOCTOR PAPERWORK?  HE WENT TO [facility] 7/14/25    PLEASE CALL PATIENT TO LET HIM KNOW

## 2025-08-26 ENCOUNTER — TELEPHONE (OUTPATIENT)
Dept: FAMILY MEDICINE CLINIC | Facility: CLINIC | Age: 70
End: 2025-08-26
Payer: MEDICARE

## (undated) DEVICE — SOL IRR H2O BTL 1000ML STRL

## (undated) DEVICE — THE SINGLE USE ETRAP – POLYP TRAP IS USED FOR SUCTION RETRIEVAL OF ENDOSCOPICALLY REMOVED POLYPS.: Brand: ETRAP

## (undated) DEVICE — SNAR POLYP CAPTIVATOR MICROHEX 13 240CM

## (undated) DEVICE — ERBE NESSY®PLATE 170 SPLIT; 168CM²; CABLE 3M: Brand: ERBE

## (undated) DEVICE — LUBE JELLY FOIL PACK 1.4 OZ: Brand: MEDLINE INDUSTRIES, INC.

## (undated) DEVICE — KT ORCA ORCAPOD DISP STRL

## (undated) DEVICE — THE CARR-LOCKE INJECTION NEEDLE IS A SINGLE USE, DISPOSABLE, FLEXIBLE SHEATH INJECTION NEEDLE USED FOR THE INJECTION OF VARIOUS TYPES OF MEDIA THROUGH FLEXIBLE ENDOSCOPES.

## (undated) DEVICE — LUBE GEL ENDOGLIDE 1.1OZ

## (undated) DEVICE — CONTN GRAD MEAS TRIANG 32OZ BLK

## (undated) DEVICE — SOLIDIFIER LIQ PREMISORB 1500CC

## (undated) DEVICE — SYR LUERLOK 50ML

## (undated) DEVICE — SAFELINER SUCTION CANISTER 1000CC: Brand: DEROYAL

## (undated) DEVICE — ADAPT CLN LUM OLYMP AIR/H20

## (undated) DEVICE — TUBING, SUCTION, 1/4" X 10', STRAIGHT: Brand: MEDLINE

## (undated) DEVICE — HYBRID CO2 TUBING/CAP SET FOR OLYMPUS® SCOPES & CO2 SOURCE: Brand: ERBE

## (undated) DEVICE — FIRST STEP BEDSIDE ADD WATER KIT - RESEALABLE STAND-UP POUCH, ENDOSCOPIC CLEANING PAD - 1 POUCH: Brand: FIRST STEP BEDSIDE ADD WATER KIT - RESEALABLE STAND-UP POUCH, ENDOSCOPIC CLEANIN

## (undated) DEVICE — INTRO ACCSR BLNT TP